# Patient Record
Sex: MALE | Race: WHITE | NOT HISPANIC OR LATINO | Employment: FULL TIME | URBAN - METROPOLITAN AREA
[De-identification: names, ages, dates, MRNs, and addresses within clinical notes are randomized per-mention and may not be internally consistent; named-entity substitution may affect disease eponyms.]

---

## 2018-05-09 ENCOUNTER — TRANSCRIBE ORDERS (OUTPATIENT)
Dept: ADMINISTRATIVE | Facility: HOSPITAL | Age: 53
End: 2018-05-09

## 2018-05-09 DIAGNOSIS — R06.02 SHORTNESS OF BREATH: Primary | ICD-10-CM

## 2018-05-14 ENCOUNTER — HOSPITAL ENCOUNTER (OUTPATIENT)
Dept: PULMONOLOGY | Facility: HOSPITAL | Age: 53
Discharge: HOME | End: 2018-05-14
Payer: COMMERCIAL

## 2018-05-14 ENCOUNTER — TRANSCRIBE ORDERS (OUTPATIENT)
Dept: PULMONOLOGY | Facility: HOSPITAL | Age: 53
End: 2018-05-14

## 2018-05-14 DIAGNOSIS — R06.00 DYSPNEA, UNSPECIFIED TYPE: Primary | ICD-10-CM

## 2018-05-14 DIAGNOSIS — R06.02 SHORTNESS OF BREATH: ICD-10-CM

## 2018-05-14 DIAGNOSIS — R06.00 DYSPNEA, UNSPECIFIED TYPE: ICD-10-CM

## 2018-05-14 PROCEDURE — 94727 GAS DIL/WSHOT DETER LNG VOL: CPT

## 2018-05-14 PROCEDURE — 94726 PLETHYSMOGRAPHY LUNG VOLUMES: CPT | Performed by: INTERNAL MEDICINE

## 2018-05-14 PROCEDURE — 94060 EVALUATION OF WHEEZING: CPT

## 2018-05-14 PROCEDURE — 94729 DIFFUSING CAPACITY: CPT | Performed by: INTERNAL MEDICINE

## 2018-05-14 PROCEDURE — 94060 EVALUATION OF WHEEZING: CPT | Performed by: INTERNAL MEDICINE

## 2018-05-14 PROCEDURE — 94760 N-INVAS EAR/PLS OXIMETRY 1: CPT

## 2018-05-14 PROCEDURE — 94729 DIFFUSING CAPACITY: CPT

## 2018-05-14 RX ORDER — ALBUTEROL SULFATE 2.5 MG/3ML
2.5 SOLUTION RESPIRATORY (INHALATION) ONCE AS NEEDED
Status: COMPLETED | OUTPATIENT
Start: 2018-05-14 | End: 2018-05-14

## 2018-05-14 RX ADMIN — ALBUTEROL SULFATE 2.5 MG: 2.5 SOLUTION RESPIRATORY (INHALATION) at 10:23

## 2018-07-17 ENCOUNTER — APPOINTMENT (OUTPATIENT)
Dept: RADIOLOGY | Facility: MEDICAL CENTER | Age: 53
End: 2018-07-17
Payer: COMMERCIAL

## 2018-07-17 ENCOUNTER — OFFICE VISIT (OUTPATIENT)
Dept: OBGYN CLINIC | Facility: CLINIC | Age: 53
End: 2018-07-17
Payer: COMMERCIAL

## 2018-07-17 DIAGNOSIS — M54.5 CHRONIC LOW BACK PAIN, UNSPECIFIED BACK PAIN LATERALITY, WITH SCIATICA PRESENCE UNSPECIFIED: ICD-10-CM

## 2018-07-17 DIAGNOSIS — G89.29 CHRONIC LOW BACK PAIN, UNSPECIFIED BACK PAIN LATERALITY, WITH SCIATICA PRESENCE UNSPECIFIED: ICD-10-CM

## 2018-07-17 DIAGNOSIS — G89.29 CHRONIC PAIN OF RIGHT KNEE: ICD-10-CM

## 2018-07-17 DIAGNOSIS — M17.11 PRIMARY OSTEOARTHRITIS OF RIGHT KNEE: ICD-10-CM

## 2018-07-17 DIAGNOSIS — M25.561 CHRONIC PAIN OF RIGHT KNEE: ICD-10-CM

## 2018-07-17 DIAGNOSIS — M17.12 PRIMARY OSTEOARTHRITIS OF LEFT KNEE: Primary | ICD-10-CM

## 2018-07-17 DIAGNOSIS — G89.29 CHRONIC PAIN OF LEFT KNEE: ICD-10-CM

## 2018-07-17 DIAGNOSIS — M25.562 CHRONIC PAIN OF LEFT KNEE: ICD-10-CM

## 2018-07-17 PROCEDURE — 73562 X-RAY EXAM OF KNEE 3: CPT

## 2018-07-17 PROCEDURE — 20610 DRAIN/INJ JOINT/BURSA W/O US: CPT | Performed by: ORTHOPAEDIC SURGERY

## 2018-07-17 PROCEDURE — 72110 X-RAY EXAM L-2 SPINE 4/>VWS: CPT

## 2018-07-17 PROCEDURE — 99203 OFFICE O/P NEW LOW 30 MIN: CPT | Performed by: ORTHOPAEDIC SURGERY

## 2018-07-17 RX ORDER — IBUPROFEN 200 MG
200 TABLET ORAL
COMMUNITY
End: 2019-02-13 | Stop reason: HOSPADM

## 2018-07-17 RX ORDER — VALSARTAN 160 MG/1
TABLET ORAL
Refills: 0 | COMMUNITY
Start: 2018-07-15 | End: 2019-03-22 | Stop reason: ALTCHOICE

## 2018-07-17 RX ORDER — BETAMETHASONE SODIUM PHOSPHATE AND BETAMETHASONE ACETATE 3; 3 MG/ML; MG/ML
12 INJECTION, SUSPENSION INTRA-ARTICULAR; INTRALESIONAL; INTRAMUSCULAR; SOFT TISSUE
Status: COMPLETED | OUTPATIENT
Start: 2018-07-17 | End: 2018-07-17

## 2018-07-17 RX ORDER — CETIRIZINE HYDROCHLORIDE 10 MG/1
10 TABLET ORAL
COMMUNITY

## 2018-07-17 RX ORDER — ACETAMINOPHEN 325 MG/1
650 TABLET ORAL EVERY 6 HOURS
COMMUNITY
End: 2019-02-13 | Stop reason: HOSPADM

## 2018-07-17 RX ORDER — BUPIVACAINE HYDROCHLORIDE 2.5 MG/ML
4 INJECTION, SOLUTION INFILTRATION; PERINEURAL
Status: COMPLETED | OUTPATIENT
Start: 2018-07-17 | End: 2018-07-17

## 2018-07-17 RX ADMIN — BUPIVACAINE HYDROCHLORIDE 4 ML: 2.5 INJECTION, SOLUTION INFILTRATION; PERINEURAL at 08:53

## 2018-07-17 RX ADMIN — BETAMETHASONE SODIUM PHOSPHATE AND BETAMETHASONE ACETATE 12 MG: 3; 3 INJECTION, SUSPENSION INTRA-ARTICULAR; INTRALESIONAL; INTRAMUSCULAR; SOFT TISSUE at 08:53

## 2018-07-17 NOTE — PROGRESS NOTES
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  His main complaint is pain in the left knee  Patient also complains of swelling in the back of the left knee  Patient presents for evaluation with radiographs  Patient is a recovering addict  He stopped using stuff 3 years ago  Patient is now and alcohol an addiction counselor  Patient used to work as a  in the past      Current Medications  Current Outpatient Prescriptions   Medication Sig Dispense Refill    acetaminophen (TYLENOL) 325 mg tablet Take 650 mg by mouth every 6 (six) hours      cetirizine (ZyrTEC) 10 mg tablet Take 10 mg by mouth      diphenhydrAMINE-APAP  MG TABS Take 1 tablet by mouth      ibuprofen (MOTRIN) 200 mg tablet Take 200 mg by mouth      valsartan (DIOVAN) 160 mg tablet   0    VENTOLIN  (90 Base) MCG/ACT inhaler Inhale 2 puffs every 4 (four) hours as needed  0     No current facility-administered medications for this visit          Current Problems    Active Problems:   Patient Active Problem List    Diagnosis Date Noted    Shortness of breath          Review of Systems:    General: negative for - chills, fatigue, fever,  weight gain or weight loss  Psychological: negative for - anxiety, behavioral disorder, concentration difficulties  Ophthalmic: negative for - blurry vision, decreased vision, double vision,  ENT: negative for - hearing difficulties   Gastrointestinal: negative for - abdominal pain,  blood in stools, change in bowel habits,  nausea/vomiting  Genito-Urinary: negative for - dysuria, incontinence, irregular/heavy menses or urinary frequency/urgency  Musculoskeletal: see HPI  Positive for joint swelling muscle pain weakness and leg swelling    Past Medical History:   Past Medical History:   Diagnosis Date    Fractures     Hypertension        Past Surgical History:   Past Surgical History:   Procedure Laterality Date    BACK SURGERY      HERNIA REPAIR      KNEE SURGERY         Family History:  Family history reviewed and non-contributory  Family History   Problem Relation Age of Onset    No Known Problems Mother     Hypertension Father        Social History:  Social History     Social History    Marital status:      Spouse name: N/A    Number of children: N/A    Years of education: N/A     Social History Main Topics    Smoking status: None    Smokeless tobacco: None    Alcohol use None    Drug use: Unknown    Sexual activity: Not Asked     Other Topics Concern    None     Social History Narrative    None       Allergies:   No Known Allergies        Physical ExaminationThere were no vitals taken for this visit    Gen: Alert and oriented to person, place, time  HEENT: EOMI, eyes clear, moist mucus membranes, hearing intact    Orthopedic Exam  Spine examination healed scar in the thoracic region  Mild discomfort in the lumbar region paraspinal muscles  No neurological deficits in both lower extremity except altered sensation due to neuropathy status post surgery    Left knee mild effusion present  Flexion 0-120 degrees with joint line tenderness and crepitus  Radiographs show tricompartmental osteoarthritis worse in medial compartment    Right knee flexion 0 to 120° with medial joint line tenderness and crepitus  Radiographs show tricompartmental osteoarthritis    Radiographs of lumbar spine show multilevel degeneration          Impression  Bilateral knee osteoarthritis with Baker's cyst and left knee posteriorly  Chronic back pain due to 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 spine with previous fusion thoracic spine        Plan    Discussed treatment with the patient  Patient aware of the importance of weight loss regular exercise and physical therapy  Left knee aspirated and injected with steroid  Will request hyaluronic acid injections for both knees  Follow-up in 3 months  Patient is aware that he will need to see pain and spine management for his back problems but does not want this done as yet  Large joint arthrocentesis  Date/Time: 7/17/2018 8:53 AM  Consent given by: patient  Site marked: site marked  Timeout: Immediately prior to procedure a time out was called to verify the correct patient, procedure, equipment, support staff and site/side marked as required   Supporting Documentation  Indications: pain and joint swelling   Procedure Details  Location: knee - L knee  Preparation: Patient was prepped and draped in the usual sterile fashion  Needle size: 22 G  Ultrasound guidance: no  Approach: anterolateral  Medications administered: 4 mL bupivacaine 0 25 %; 12 mg betamethasone acetate-betamethasone sodium phosphate 6 (3-3) mg/mL    Aspirate amount: 10 mL  Aspirate: clear and yellow  Patient tolerance: patient tolerated the procedure well with no immediate complications  Dressing:  Sterile dressing applied        Kelechi Wall MD        Portions of the record may have been created with voice recognition software   Occasional wrong word or "sound a like" substitutions may have occurred due to the inherent limitations of voice recognition software   Read the chart carefully and recognize, using context, where substitutions have occurred

## 2018-07-30 ENCOUNTER — TELEPHONE (OUTPATIENT)
Dept: OBGYN CLINIC | Facility: HOSPITAL | Age: 53
End: 2018-07-30

## 2018-08-21 ENCOUNTER — OFFICE VISIT (OUTPATIENT)
Dept: OBGYN CLINIC | Facility: CLINIC | Age: 53
End: 2018-08-21
Payer: COMMERCIAL

## 2018-08-21 VITALS
DIASTOLIC BLOOD PRESSURE: 78 MMHG | HEIGHT: 75 IN | BODY MASS INDEX: 30.54 KG/M2 | RESPIRATION RATE: 12 BRPM | WEIGHT: 245.6 LBS | HEART RATE: 87 BPM | SYSTOLIC BLOOD PRESSURE: 123 MMHG

## 2018-08-21 DIAGNOSIS — M17.12 PRIMARY OSTEOARTHRITIS OF LEFT KNEE: Primary | ICD-10-CM

## 2018-08-21 DIAGNOSIS — M17.11 PRIMARY OSTEOARTHRITIS OF RIGHT KNEE: ICD-10-CM

## 2018-08-21 PROCEDURE — 99212 OFFICE O/P EST SF 10 MIN: CPT | Performed by: ORTHOPAEDIC SURGERY

## 2018-08-21 NOTE — PROGRESS NOTES
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  Reports he is negative for hepatitis and HIV as he was tested for this  He states his left knee does give out at times  He worked previously as a  and was hard on his knees  He is considering joint replacement  Review of Systems  Review of systems negative unless otherwise specified in HPI    Past Medical History  Past Medical History:   Diagnosis Date    Fractures     Hypertension      Past Surgical History  Past Surgical History:   Procedure Laterality Date    BACK SURGERY      HERNIA REPAIR      KNEE SURGERY       Current Medications  Current Outpatient Prescriptions on File Prior to Visit   Medication Sig Dispense Refill    acetaminophen (TYLENOL) 325 mg tablet Take 650 mg by mouth every 6 (six) hours      cetirizine (ZyrTEC) 10 mg tablet Take 10 mg by mouth      diphenhydrAMINE-APAP  MG TABS Take 1 tablet by mouth      ibuprofen (MOTRIN) 200 mg tablet Take 200 mg by mouth      valsartan (DIOVAN) 160 mg tablet   0    VENTOLIN  (90 Base) MCG/ACT inhaler Inhale 2 puffs every 4 (four) hours as needed  0     No current facility-administered medications on file prior to visit  Recent Labs (HCT,HGB,PT,INR,ESR,CRP,GLU,HgA1C)  No results found for: HCT, HGB, WBC, PT, INR, ESR, CRP, GLUCOSE, HGBA1C    Physical exam  · General: Awake, Alert, Oriented  · Eyes: Pupils equal, round and reactive to light  · Heart: regular rate and rhythm  · Lungs: No audible wheezing  · Abdomen: soft , thin  bilateral Knee exam  ·  bilateral knees with out any current cutaneous lesions  There is well-healed arthroscopy scars    Right knee with full extension flexion 120  Left knee 3-120 degrees  Positive Baker cyst left knee and trace effusion  Medial joint line tenderness both knees  Quad and hamstring strength right knee 5/5 left knee 4+/5  Dorsalis pedis posterior tibial pulses intact 4/4  Procedure   none    Imaging   none  1  Primary osteoarthritis of left knee    2  Primary osteoarthritis of right knee      Assessment:  bilateral  Knee osteoarthritis left more symptomatic than right with Baker cyst as well  Plan:    As the patient only got minimally with cortisone injection in due to his young age we will attempt to get viscosupplementation approved as long as is not cost prohibitive for him in his insurance  Would and inject both knees with disc and see how he does  Ultimately the patient will require total knee arthroplasties at sometime but again due to his young age would like to hold off as long as possible  Patient does not want to do physical therapy as he has done his numerous times in the past knee does work out at [employer] and use a bicycle as well  Continue ice and elevation as needed  Anti-inflammatories as needed  He was given a pamphlet on Synvisc-One in the office today

## 2018-08-21 NOTE — PATIENT INSTRUCTIONS
As the patient only got minimally with cortisone injection in due to his young age we will attempt to get viscosupplementation approved as long as is not cost prohibitive for him in his insurance  Would and inject both knees with disc and see how he does  Ultimately the patient will require total knee arthroplasties at sometime but again due to his young age would like to hold off as long as possible  Patient does not want to do physical therapy as he has done his numerous times in the past knee does work out at [employer] and use a bicycle as well  Continue ice and elevation as needed  Anti-inflammatories as needed

## 2018-08-27 ENCOUNTER — TELEPHONE (OUTPATIENT)
Dept: OBGYN CLINIC | Facility: HOSPITAL | Age: 53
End: 2018-08-27

## 2018-08-27 NOTE — TELEPHONE ENCOUNTER
Spoke with Azucena Aden at [facility] and they informed me that they had tried calling the patient to schedule delivery and they have had no contact with them  I resent the script in again to restart the process

## 2018-09-17 ENCOUNTER — TELEPHONE (OUTPATIENT)
Dept: OBGYN CLINIC | Facility: HOSPITAL | Age: 53
End: 2018-09-17

## 2018-09-17 NOTE — TELEPHONE ENCOUNTER
Left message for patient informing him that we received his bilateral Synvisc-One injections in the office  Please schedule the patients appointment accordingly        BILATERAL SYNVISC-ONE INJECTION([facility]///DO NOT BILL)

## 2018-09-21 ENCOUNTER — OFFICE VISIT (OUTPATIENT)
Dept: OBGYN CLINIC | Facility: CLINIC | Age: 53
End: 2018-09-21
Payer: COMMERCIAL

## 2018-09-21 VITALS
DIASTOLIC BLOOD PRESSURE: 84 MMHG | SYSTOLIC BLOOD PRESSURE: 119 MMHG | BODY MASS INDEX: 30.21 KG/M2 | HEART RATE: 71 BPM | HEIGHT: 75 IN | WEIGHT: 243 LBS

## 2018-09-21 DIAGNOSIS — M17.11 PRIMARY OSTEOARTHRITIS OF RIGHT KNEE: ICD-10-CM

## 2018-09-21 DIAGNOSIS — M17.12 PRIMARY OSTEOARTHRITIS OF LEFT KNEE: Primary | ICD-10-CM

## 2018-09-21 PROCEDURE — 20610 DRAIN/INJ JOINT/BURSA W/O US: CPT | Performed by: ORTHOPAEDIC SURGERY

## 2018-09-21 NOTE — PATIENT INSTRUCTIONS
Knee Exercises   WHAT YOU NEED TO KNOW:   What do I need to know about knee exercises? Knee exercises help strengthen the muscles around your knee  Strong muscles can help reduce pain and decrease your risk of future injury  Knee exercises also help you heal after an injury or surgery  · Start slow  These are beginning exercises  Ask your healthcare provider if you need to see a physical therapist for more advanced exercises  As you get stronger, you may be able to do more sets of each exercise or add weights  · Stop if you feel pain  It is normal to feel some discomfort at first  Regular exercise will help decrease your discomfort over time  · Do the exercises on both legs  Do this so both knees remain strong  · Warm up before you do knee exercises  Walk or ride a stationary bike for 5 or 10 minutes to warm your muscles  How do I perform knee stretches safely? Always stretch before you do strengthening exercises  Do these stretching exercises again after you do the strengthening exercises  Do these stretches 4 or 5 days a week, or as directed  · Standing calf stretch: Face a wall and place both palms flat on the wall, or hold the back of a chair for balance  Keep a slight bend in your knees  Take a big step backward with one leg  Keep your other leg directly under you  Keep both heels flat and press your hips forward  Hold the stretch for 30 seconds, and then relax for 30 seconds  Switch legs  Repeat 2 or 3 times on each leg  · Standing quadriceps stretch:  Stand and place one hand against a wall or hold the back of a chair for balance  With your weight on one leg, bend your other leg and grab your ankle  Bring your heel toward your buttocks  Hold the stretch for 30 to 60 seconds  Switch legs  Repeat 2 or 3 times on each leg  · Sitting hamstring stretch:  Sit with both legs straight in front of you  Do not point or flex your toes   Place your palms on the floor and slide your hands forward until you feel the stretch  Do not round your back  Hold the stretch for 30 seconds  Repeat 2 or 3 times  How do I perform knee strengthening exercises safely? Do these exercises 4 or 5 days a week, or as directed  · Standing half squats:  Stand with your feet shoulder-width apart  Lean your back against a wall or hold the back of a chair for balance, if needed  Slowly sit down about 10 inches, as if you are going to sit in a chair  Your body weight should be mostly over your heels  Hold the squat for 5 seconds, then rise to a standing position  Do 3 sets of 10 squats to strengthen your buttocks and thighs  · Standing hamstring curls: Face a wall and place both palms flat on the wall, or hold the back of a chair for balance  With your weight on one leg, lift your other foot as close to your buttocks as you can  Hold for 5 seconds and then lower your leg  Do 2 sets of 10 curls on each leg  This exercise strengthens the muscles in the back of your thigh  · Standing calf raises:  Face a wall and place both palms flat on the wall, or hold the back of a chair for balance  Stand up straight, and do not lean  Place all your weight on one leg by lifting the other foot off the floor  Raise the heel of the foot that is on the floor as high as you can and then lower it  Do 2 sets of 10 calf raises on each leg to strengthen your calf muscles  · Straight leg lifts:  Lie on your stomach with straight legs  Fold your arms in front of you and rest your head in your arms  Tighten your leg muscles and raise one leg as high as you can  Hold for 5 seconds, then lower your leg  Do 2 sets of 10 lifts on each leg to strengthen your buttocks  · Sitting leg lifts:  Sit in a chair  Slowly straighten and raise one leg  Squeeze your thigh muscles and hold for 5 seconds  Relax and return your foot to the floor  Do 2 sets of 10 lifts on each leg   This helps strengthen the muscles in the front of your thigh  When should I contact my healthcare provider? · You have new pain or your pain becomes worse  · You have questions or concerns about your condition or care  CARE AGREEMENT:   You have the right to help plan your care  Learn about your health condition and how it may be treated  Discuss treatment options with your caregivers to decide what care you want to receive  You always have the right to refuse treatment  The above information is an  only  It is not intended as medical advice for individual conditions or treatments  Talk to your doctor, nurse or pharmacist before following any medical regimen to see if it is safe and effective for you  © 2017 [facility] Information is for End User's use only and may not be sold, redistributed or otherwise used for commercial purposes  All illustrations and images included in CareNotes® are the copyrighted property of A D A M [employer] Inc  or [employer]

## 2018-09-21 NOTE — PROGRESS NOTES
Chief Complaint  Bilateral knee pain due to osteoarthritis    History Of Presenting Illness  Inocencio Mahmood 1965 presents with bilateral knee pain due osteoarthritis  Patient presents for bilateral Synvisc-One injection      Current Medications  Current Outpatient Prescriptions   Medication Sig Dispense Refill    acetaminophen (TYLENOL) 325 mg tablet Take 650 mg by mouth every 6 (six) hours      cetirizine (ZyrTEC) 10 mg tablet Take 10 mg by mouth      diphenhydrAMINE-APAP  MG TABS Take 1 tablet by mouth      ibuprofen (MOTRIN) 200 mg tablet Take 200 mg by mouth      valsartan (DIOVAN) 160 mg tablet   0    VENTOLIN  (90 Base) MCG/ACT inhaler Inhale 2 puffs every 4 (four) hours as needed  0     No current facility-administered medications for this visit          Current Problems    Active Problems:   Patient Active Problem List    Diagnosis Date Noted    Shortness of breath          Review of Systems:    General: negative for - chills, fatigue, fever,  weight gain or weight loss  Psychological: negative for - anxiety, behavioral disorder, concentration difficulties      Past Medical History:   Past Medical History:   Diagnosis Date    Fractures     Hypertension        Past Surgical History:   Past Surgical History:   Procedure Laterality Date    BACK SURGERY      HERNIA REPAIR      KNEE SURGERY         Family History:  Family history reviewed and non-contributory  Family History   Problem Relation Age of Onset    No Known Problems Mother     Hypertension Father        Social History:  Social History     Social History    Marital status:      Spouse name: N/A    Number of children: N/A    Years of education: N/A     Social History Main Topics    Smoking status: Never Smoker    Smokeless tobacco: Never Used    Alcohol use Yes      Comment: In Recovery    Drug use: Yes      Comment: In Recovery    Sexual activity: Yes     Other Topics Concern    None     Social History Narrative    None       Allergies:   No Known Allergies        Physical ExaminationBP 119/84   Pulse 71   Ht 6' 3" (1 905 m)   Wt 110 kg (243 lb)   BMI 30 37 kg/m²   Gen: Alert and oriented to person, place, time  HEENT: EOMI, eyes clear, moist mucus membranes, hearing intact      Orthopedic Exam  Both knees no effusion  Range of motion 0-120 degrees joint line tenderness          Impression  Bilateral knee osteoarthritis            Plan    Both knees injected with Synvisc-One  Follow-up 5 months x-ray on arrival both knees  Large joint arthrocentesis  Date/Time: 9/21/2018 10:42 AM  Consent given by: patient  Site marked: site marked  Timeout: Immediately prior to procedure a time out was called to verify the correct patient, procedure, equipment, support staff and site/side marked as required   Supporting Documentation  Indications: pain and joint swelling   Procedure Details  Location: knee - R knee  Preparation: Patient was prepped and draped in the usual sterile fashion  Needle size: 22 G  Ultrasound guidance: no  Approach: anterolateral  Medications administered: 48 mg hylan 48 MG/6ML    Patient tolerance: patient tolerated the procedure well with no immediate complications  Dressing:  Sterile dressing applied  Large joint arthrocentesis  Date/Time: 9/21/2018 10:43 AM  Consent given by: patient  Site marked: site marked  Timeout: Immediately prior to procedure a time out was called to verify the correct patient, procedure, equipment, support staff and site/side marked as required   Supporting Documentation  Indications: pain and joint swelling   Procedure Details  Location: knee - L knee  Preparation: Patient was prepped and draped in the usual sterile fashion  Needle size: 22 G  Ultrasound guidance: no  Approach: anterolateral  Medications administered: 48 mg hylan 48 MG/6ML    Patient tolerance: patient tolerated the procedure well with no immediate complications  Dressing:  Sterile dressing applied        Shelley Ocampo MD        Portions of the record may have been created with voice recognition software   Occasional wrong word or "sound a like" substitutions may have occurred due to the inherent limitations of voice recognition software   Read the chart carefully and recognize, using context, where substitutions have occurred

## 2019-01-11 ENCOUNTER — OFFICE VISIT (OUTPATIENT)
Dept: OBGYN CLINIC | Facility: CLINIC | Age: 54
End: 2019-01-11
Payer: COMMERCIAL

## 2019-01-11 VITALS
HEIGHT: 75 IN | BODY MASS INDEX: 30.46 KG/M2 | WEIGHT: 245 LBS | HEART RATE: 85 BPM | DIASTOLIC BLOOD PRESSURE: 89 MMHG | SYSTOLIC BLOOD PRESSURE: 136 MMHG

## 2019-01-11 DIAGNOSIS — M17.12 PRIMARY OSTEOARTHRITIS OF LEFT KNEE: Primary | ICD-10-CM

## 2019-01-11 DIAGNOSIS — M17.11 PRIMARY OSTEOARTHRITIS OF RIGHT KNEE: ICD-10-CM

## 2019-01-11 PROCEDURE — 99213 OFFICE O/P EST LOW 20 MIN: CPT | Performed by: ORTHOPAEDIC SURGERY

## 2019-01-11 PROCEDURE — 20610 DRAIN/INJ JOINT/BURSA W/O US: CPT | Performed by: ORTHOPAEDIC SURGERY

## 2019-01-11 RX ORDER — BUPIVACAINE HYDROCHLORIDE 2.5 MG/ML
4 INJECTION, SOLUTION INFILTRATION; PERINEURAL
Status: COMPLETED | OUTPATIENT
Start: 2019-01-11 | End: 2019-01-11

## 2019-01-11 RX ORDER — BETAMETHASONE SODIUM PHOSPHATE AND BETAMETHASONE ACETATE 3; 3 MG/ML; MG/ML
12 INJECTION, SUSPENSION INTRA-ARTICULAR; INTRALESIONAL; INTRAMUSCULAR; SOFT TISSUE
Status: COMPLETED | OUTPATIENT
Start: 2019-01-11 | End: 2019-01-11

## 2019-01-11 RX ADMIN — BETAMETHASONE SODIUM PHOSPHATE AND BETAMETHASONE ACETATE 12 MG: 3; 3 INJECTION, SUSPENSION INTRA-ARTICULAR; INTRALESIONAL; INTRAMUSCULAR; SOFT TISSUE at 15:55

## 2019-01-11 RX ADMIN — BUPIVACAINE HYDROCHLORIDE 4 ML: 2.5 INJECTION, SOLUTION INFILTRATION; PERINEURAL at 16:00

## 2019-01-11 RX ADMIN — BETAMETHASONE SODIUM PHOSPHATE AND BETAMETHASONE ACETATE 12 MG: 3; 3 INJECTION, SUSPENSION INTRA-ARTICULAR; INTRALESIONAL; INTRAMUSCULAR; SOFT TISSUE at 16:00

## 2019-01-11 RX ADMIN — BUPIVACAINE HYDROCHLORIDE 4 ML: 2.5 INJECTION, SOLUTION INFILTRATION; PERINEURAL at 15:55

## 2019-01-11 NOTE — PROGRESS NOTES
Chief Complaint  Bilateral knee pain    History Of Presenting Illness  Connor Art 1965 presents with bilateral knee pain due to osteoarthritis  Patient had Synvisc-One injection both knees September 21st, 2018  Excellent relief of symptoms till now  Patient is getting an aching discomfort in both knees  Patient would like repeat steroid injections      Current Medications  Current Outpatient Prescriptions   Medication Sig Dispense Refill    acetaminophen (TYLENOL) 325 mg tablet Take 650 mg by mouth every 6 (six) hours      cetirizine (ZyrTEC) 10 mg tablet Take 10 mg by mouth      diphenhydrAMINE-APAP  MG TABS Take 1 tablet by mouth      ibuprofen (MOTRIN) 200 mg tablet Take 200 mg by mouth      VENTOLIN  (90 Base) MCG/ACT inhaler Inhale 2 puffs every 4 (four) hours as needed  0    valsartan (DIOVAN) 160 mg tablet   0     No current facility-administered medications for this visit          Current Problems    Active Problems:   Patient Active Problem List    Diagnosis Date Noted    Shortness of breath          Review of Systems:    General: negative for - chills, fatigue, fever,  weight gain or weight loss      Past Medical History:   Past Medical History:   Diagnosis Date    Fractures     Hypertension        Past Surgical History:   Past Surgical History:   Procedure Laterality Date    BACK SURGERY      HERNIA REPAIR      KNEE SURGERY         Family History:  Family history reviewed and non-contributory  Family History   Problem Relation Age of Onset    No Known Problems Mother     Hypertension Father        Social History:  Social History     Social History    Marital status:      Spouse name: N/A    Number of children: N/A    Years of education: N/A     Social History Main Topics    Smoking status: Never Smoker    Smokeless tobacco: Never Used    Alcohol use Yes      Comment: In Recovery    Drug use: Yes      Comment: In Recovery    Sexual activity: Yes Other Topics Concern    None     Social History Narrative    None       Allergies:   No Known Allergies        Physical ExaminationBP 136/89   Pulse 85   Ht 6' 3" (1 905 m)   Wt 111 kg (245 lb)   BMI 30 62 kg/m²   Gen: Alert and oriented to person, place, time      Orthopedic Exam  Both knees no effusion  0-130 flexion with joint line tenderness mild crepitus  Radiographs confirm medial compartment osteoarthritis          Impression  Bilateral knee osteoarthritis            Plan    Discussed treatment with the patient  Patient aware of the importance of weight loss regular exercise over-the-counter pain medication icing  Both knees injected with steroid and local anesthetic  Follow-up in April 2019 for repeat Synvisc-One injection  Large joint arthrocentesis  Date/Time: 1/11/2019 3:55 PM  Consent given by: patient  Site marked: site marked  Timeout: Immediately prior to procedure a time out was called to verify the correct patient, procedure, equipment, support staff and site/side marked as required   Supporting Documentation  Indications: pain and joint swelling   Procedure Details  Location: knee - L knee  Preparation: Patient was prepped and draped in the usual sterile fashion  Needle size: 22 G  Ultrasound guidance: no  Approach: anterolateral  Medications administered: 4 mL bupivacaine 0 25 %; 12 mg betamethasone acetate-betamethasone sodium phosphate 6 (3-3) mg/mL    Patient tolerance: patient tolerated the procedure well with no immediate complications  Dressing:  Sterile dressing applied  Large joint arthrocentesis  Date/Time: 1/11/2019 4:00 PM  Consent given by: patient  Site marked: site marked  Timeout: Immediately prior to procedure a time out was called to verify the correct patient, procedure, equipment, support staff and site/side marked as required   Supporting Documentation  Indications: pain and joint swelling   Procedure Details  Location: knee - R knee  Preparation: Patient was prepped and draped in the usual sterile fashion  Needle size: 22 G  Ultrasound guidance: no  Approach: anterolateral  Medications administered: 4 mL bupivacaine 0 25 %; 12 mg betamethasone acetate-betamethasone sodium phosphate 6 (3-3) mg/mL    Patient tolerance: patient tolerated the procedure well with no immediate complications  Dressing:  Sterile dressing applied      Joanne Moore MD        Portions of the record may have been created with voice recognition software   Occasional wrong word or "sound a like" substitutions may have occurred due to the inherent limitations of voice recognition software   Read the chart carefully and recognize, using context, where substitutions have occurred

## 2019-01-11 NOTE — PATIENT INSTRUCTIONS
Decision Aid for Knee Osteoarthritis   AMBULATORY CARE:   What you need to know about decisions for knee osteoarthritis:  You can help make decisions about being screened for osteoarthritis  You can also help plan treatment if osteoarthritis is found with screening, or you develop it  Osteoarthritis screening is a test done to find osteoarthritis early  Screening is different from diagnosis because screening is used when you first start to have signs or symptoms  This means management or treatment can start early to help prevent joint damage  Treatments include medicine and surgery, but you can also manage osteoarthritis with lifestyle changes  What you need to know about knee osteoarthritis:   · Osteoarthritis is a condition that causes tissue between bones to wear away  This can happen slowly over time or quickly because of an injury  The bones rub together when you move  This causes pain and can limit mobility  · Osteoarthritis is the most common form of arthritis  About 30 million people in the [place] have osteoarthritis  · Osteoarthritis is more common in women than in men  Other risk factors include being overweight or having a family history of osteoarthritis  · Talk to your healthcare provider if you think you have signs or symptoms of osteoarthritis  Examples include joint pain with movement, tender or stiff joint, or a grating feeling with movement  You may also feel hard lumps around the joint, called bone spurs  How to know if you are a good candidate for osteoarthritis screening:  Screening may be helpful for you if any of the following is true:  · You are 50 years or older  · You are overweight or obese  · You do work or play a sport that puts repeated stress on your knee joint  · You have a family history of osteoarthritis  · You had a knee joint injury, even if it happened many years ago  · You were born with a joint or cartilage problem      · You have mild signs or symptoms of osteoarthritis  · Your symptoms are starting to affect your ability to do your daily activities  How osteoarthritis screening is done:  No test is used to diagnose osteoarthritis  Your healthcare provider will check the following:  · How much pain, tenderness, or inflammation you have in the joint    · Range of motion in the joint (how far it can move in every direction)    · Your ability to walk without pain, or how your symptoms changed the way you walk    · The strength of muscles around the joint  Benefits and risks of screening:  Talk with your healthcare provider about the risks and benefits of screening:  · Benefits  include finding osteoarthritis early, when you can manage the condition  You may be able to slow the progress of joint damage by losing weight or exercising more  You can also help make a treatment plan that you can change over time as needed  · Risks  include a false belief that you will not develop osteoarthritis  Your screening test may find that you do not have osteoarthritis  This can be because signs and symptoms are mild  You can still develop more severe signs and symptoms over time  It is important to talk with your healthcare provider about how often to have screening  Questions to ask your healthcare provider to help you make decisions about screening:   · How high is my risk for osteoarthritis? · How often do I need to have screening? · Where is the screening done? · Do I need to do anything to get ready to have screening? What happens after osteoarthritis screening: You will meet with your healthcare provider to go over the results of your screening  You, your family or caregiver, and your healthcare provider can talk about your treatment options  Together you can decide which treatment is right for you   You may need more tests to diagnose anything that showed up on the screening test  Common tests include an x-ray or CT scan to check the amount of tissue between bones or to find bone spurs  How osteoarthritis is treated, and the benefits of treatment:   · Lifestyle changes  include weight loss and exercise  Weight loss can help take pressure off the joint  Exercise can help build muscles around the joint  This helps build stability and strength  Your healthcare provider, a dietitian, or a physical therapist can help you make nutrition and exercise plans  · Devices  can help you stay active and relieve your symptoms  Shoe inserts support your joints and take pressure off the joints  This can help reduce pain that happens when you stand or walk  A knee brace can add stability to your knee when you walk  A cane can help take weight off your knee as you walk  · Medicines  include acetaminophen, NSAIDs, and certain antidepressants  NSAIDs, such as ibuprofen, and acetaminophen can be taken as a pill or rubbed into your skin in a cream  Acetaminophen and NSAIDs help relieve pain  NSAIDs also help reduce inflammation  Both medicines are available without a prescription  Do not take these medicines without talking to your healthcare provider first  Stronger forms of these medicines are available with a prescription  Antidepressants are only available with a prescription  · Therapy  includes physical and occupational therapy  A physical therapist can help you strengthen muscles around the joint and increase your range of motion  An occupational therapist can help you find easier ways to do your daily activities  For example, you may be shown how to climb stairs without putting stress on your knee  · Steroid medicine  may be injected into the knee area if your symptoms become worse  This can reduce inflammation and relieve pain  · Surgery  may be done if other treatments do not work and your symptoms become severe  Debridement is surgery to clean pieces of bone and cartilage out of the joint  This is done if you have knee buckling or locking   Osteotomy is surgery to move bones into a different position so they do not rub against other bones  Surgery called arthroplasty is used to remove the damaged joint and replace it with an artificial joint  Surgery can relieve pain by removing the problem that is causing your pain  Risks of osteoarthritis treatment:   · Lifestyle changes  will not reverse the damage to the tissue between your joints  It may prevent symptoms from getting worse  It may also be difficult to exercise if you have severe knee pain  · Medicines  must be taken in limited doses  Acetaminophen can cause liver damage, and NSAIDs can cause kidney damage  The pain may come back before you can take another dose  Antidepressants can cause certain side effects  · Steroid injections  are usually limited to about 4 each year  This is because the medicine can cause joint damage over time  Steroids can also increase your risk for infections, cause changes in your mood, and increase blood sugar levels  · Surgery  increases your risk for an infection or blood clot  Nerves, blood vessels, or tissues around the knee can be damaged during an osteotomy  An artificial joint may wear out over time  It may also become loose  It will need to be replaced if it wears out or comes loose  You will need to do knee exercises to prevent scar tissue from building up in your knee  This can be painful  Questions to ask your healthcare provider to help you make decisions about treatment:   · How will I know if signs and symptoms are becoming severe enough to need treatment? · How long will it take for each treatment option to help my knee pain get better? · Which pain medicines will work best for me? · Am I a good candidate for steroid injections? · Am I a good candidate for knee replacement surgery? · How long is recovery from knee replacement surgery? · Will I need to have more surgery over time?     · How often will I need to do knee exercises to prevent scar tissue from building up? © 2017 [facility] Information is for End User's use only and may not be sold, redistributed or otherwise used for commercial purposes  All illustrations and images included in CareNotes® are the copyrighted property of A D A M [employer] Inc  or Azar Carty  The above information is an  only  It is not intended as medical advice for individual conditions or treatments  Talk to your doctor, nurse or pharmacist before following any medical regimen to see if it is safe and effective for you

## 2019-01-24 ENCOUNTER — OFFICE VISIT (OUTPATIENT)
Dept: SURGERY | Facility: HOSPITAL | Age: 54
End: 2019-01-24
Payer: COMMERCIAL

## 2019-01-24 VITALS
BODY MASS INDEX: 30.96 KG/M2 | SYSTOLIC BLOOD PRESSURE: 146 MMHG | TEMPERATURE: 98.7 F | DIASTOLIC BLOOD PRESSURE: 86 MMHG | HEIGHT: 75 IN | WEIGHT: 249 LBS | HEART RATE: 79 BPM

## 2019-01-24 DIAGNOSIS — D17.1 LIPOMA OF TORSO: ICD-10-CM

## 2019-01-24 DIAGNOSIS — K42.9 UMBILICAL HERNIA WITHOUT OBSTRUCTION AND WITHOUT GANGRENE: Primary | ICD-10-CM

## 2019-01-24 PROCEDURE — 99204 OFFICE O/P NEW MOD 45 MIN: CPT | Performed by: SURGERY

## 2019-01-24 RX ORDER — SODIUM CHLORIDE, SODIUM LACTATE, POTASSIUM CHLORIDE, CALCIUM CHLORIDE 600; 310; 30; 20 MG/100ML; MG/100ML; MG/100ML; MG/100ML
125 INJECTION, SOLUTION INTRAVENOUS CONTINUOUS
Status: CANCELLED | OUTPATIENT
Start: 2019-01-24

## 2019-01-24 RX ORDER — CEFAZOLIN SODIUM 2 G/50ML
2000 SOLUTION INTRAVENOUS ONCE
Status: CANCELLED | OUTPATIENT
Start: 2019-02-06 | End: 2019-01-24

## 2019-01-24 RX ORDER — CHLORHEXIDINE GLUCONATE 4 G/100ML
SOLUTION TOPICAL DAILY PRN
Status: CANCELLED | OUTPATIENT
Start: 2019-01-24

## 2019-01-24 NOTE — H&P
Assessment/Plan:    Umbilical hernia  Patient with history of previous laparoscopic surgery now with a bulge  likely a umbilical hernia containing fat  It is non reducible  Patient like this repaired  I will get an ultrasound for further evaluation and confirmation  In the interim patient will be scheduled for likely umbilical hernia repair in addition to removal of the abdominal lipomas  The procedure self including also she had risk and benefits were discussed the patient great length including bleeding, infection, recurrence, injury to surrounding structures  Patient verbalized understands risks is willing proceed, consent was signed  He will undergo an EKG prior to surgery    Lipoma of torso  Patient with multiple lipomas of the torso  He would like to have these removed  Think this is reasonable  Will schedule him for excision of multiple lipomas of the abdominal wall  The procedure self including all associated risks and benefits were discussed at great length  This included bleeding, infection, recurrence  The patient verbalized understands risks is willing to proceed, consent was signed  He will undergo an EKG prior surgery       Diagnoses and all orders for this visit:    Umbilical hernia without obstruction and without gangrene  -     US abdominal wall; Future    Lipoma of torso          Subjective:      Patient ID: Apryl Dockery is a 52 y o  male  61-year-old gentle with a history of hypertension, laparoscopic inguinal hernia pair, presents for evaluation potential lipomas and also a bulge around the umbilicus  Patient states he has multiple lipomas on along his abdominal wall which cause him significant discomfort  Addition he has a bulge just above the umbilicus which she has was instructed is lipoma or this was a hernia  Patient denies any overlying skin changes of either the lipoma is or the skin overlying the hernia  Denies any nausea vomiting  No fevers or chills    No chest pain shortness of breath  No changes in bowel bladder habits  The following portions of the patient's history were reviewed and updated as appropriate:   He  has a past medical history of Fractures and Hypertension  He   Patient Active Problem List    Diagnosis Date Noted    Lipoma of torso 79/17/8626    Umbilical hernia 58/26/8152    Shortness of breath      He  has a past surgical history that includes Knee surgery; Hernia repair; and Back surgery  His family history includes Hypertension in his father; No Known Problems in his mother  He  reports that he has never smoked  He has never used smokeless tobacco  He reports that he drinks alcohol  He reports that he uses drugs  Current Outpatient Prescriptions   Medication Sig Dispense Refill    acetaminophen (TYLENOL) 325 mg tablet Take 650 mg by mouth every 6 (six) hours      cetirizine (ZyrTEC) 10 mg tablet Take 10 mg by mouth      diphenhydrAMINE-APAP  MG TABS Take 1 tablet by mouth      ibuprofen (MOTRIN) 200 mg tablet Take 200 mg by mouth      valsartan (DIOVAN) 160 mg tablet   0    VENTOLIN  (90 Base) MCG/ACT inhaler Inhale 2 puffs every 4 (four) hours as needed  0     No current facility-administered medications for this visit  He is allergic to no active allergies       Review of Systems      A 10 point review systems was conducted, all negative except as noted above HPI  Objective:      /86   Pulse 79   Temp 98 7 °F (37 1 °C)   Ht 6' 3" (1 905 m)   Wt 113 kg (249 lb)   BMI 31 12 kg/m²           Physical Exam   Constitutional: He is oriented to person, place, and time  He appears well-developed and well-nourished  No distress  HENT:   Head: Normocephalic and atraumatic  Eyes: No scleral icterus  Neck: Normal range of motion  No tracheal deviation present  Cardiovascular: Normal rate, regular rhythm and normal heart sounds  Exam reveals no gallop and no friction rub      No murmur heard   Pulmonary/Chest: Breath sounds normal  No respiratory distress  He has no wheezes  He has no rales  He exhibits no tenderness  Abdominal: Soft  Bowel sounds are normal  He exhibits no distension and no mass  There is tenderness (Tenderness overlying the subcutaneous lipoma sites  )  There is no rebound and no guarding  Supraumbilical bulge non reducible likely consistent with periumbilical hernia containing fat from previous laparoscopic surgery  Musculoskeletal: Normal range of motion  He exhibits no edema, tenderness or deformity  Lymphadenopathy:     He has no cervical adenopathy  Neurological: He is alert and oriented to person, place, and time  No cranial nerve deficit  Skin: Skin is warm and dry  No rash noted  He is not diaphoretic  No erythema  No pallor  Three lipomas measuring approximately 2-3 cm noted the abdominal wall  No overlying skin changes  These are tender to palpation   Psychiatric: He has a normal mood and affect  His behavior is normal    Vitals reviewed

## 2019-01-24 NOTE — ASSESSMENT & PLAN NOTE
Patient with history of previous laparoscopic surgery now with a bulge  likely a umbilical hernia containing fat  It is non reducible  Patient like this repaired  I will get an ultrasound for further evaluation and confirmation  In the interim patient will be scheduled for likely umbilical hernia repair in addition to removal of the abdominal lipomas  The procedure self including also she had risk and benefits were discussed the patient great length including bleeding, infection, recurrence, injury to surrounding structures  Patient verbalized understands risks is willing proceed, consent was signed    He will undergo an EKG prior to surgery

## 2019-01-24 NOTE — ASSESSMENT & PLAN NOTE
Patient with multiple lipomas of the torso  He would like to have these removed  Think this is reasonable  Will schedule him for excision of multiple lipomas of the abdominal wall  The procedure self including all associated risks and benefits were discussed at great length  This included bleeding, infection, recurrence  The patient verbalized understands risks is willing to proceed, consent was signed    He will undergo an EKG prior surgery

## 2019-01-24 NOTE — H&P (VIEW-ONLY)
Assessment/Plan:    Umbilical hernia  Patient with history of previous laparoscopic surgery now with a bulge  likely a umbilical hernia containing fat  It is non reducible  Patient like this repaired  I will get an ultrasound for further evaluation and confirmation  In the interim patient will be scheduled for likely umbilical hernia repair in addition to removal of the abdominal lipomas  The procedure self including also she had risk and benefits were discussed the patient great length including bleeding, infection, recurrence, injury to surrounding structures  Patient verbalized understands risks is willing proceed, consent was signed  He will undergo an EKG prior to surgery    Lipoma of torso  Patient with multiple lipomas of the torso  He would like to have these removed  Think this is reasonable  Will schedule him for excision of multiple lipomas of the abdominal wall  The procedure self including all associated risks and benefits were discussed at great length  This included bleeding, infection, recurrence  The patient verbalized understands risks is willing to proceed, consent was signed  He will undergo an EKG prior surgery       Diagnoses and all orders for this visit:    Umbilical hernia without obstruction and without gangrene  -     US abdominal wall; Future    Lipoma of torso          Subjective:      Patient ID: Giuseppe Holland is a 52 y o  male  58-year-old gentle with a history of hypertension, laparoscopic inguinal hernia pair, presents for evaluation potential lipomas and also a bulge around the umbilicus  Patient states he has multiple lipomas on along his abdominal wall which cause him significant discomfort  Addition he has a bulge just above the umbilicus which she has was instructed is lipoma or this was a hernia  Patient denies any overlying skin changes of either the lipoma is or the skin overlying the hernia  Denies any nausea vomiting  No fevers or chills    No chest pain shortness of breath  No changes in bowel bladder habits  The following portions of the patient's history were reviewed and updated as appropriate:   He  has a past medical history of Fractures and Hypertension  He   Patient Active Problem List    Diagnosis Date Noted    Lipoma of torso 39/05/7126    Umbilical hernia 96/17/5124    Shortness of breath      He  has a past surgical history that includes Knee surgery; Hernia repair; and Back surgery  His family history includes Hypertension in his father; No Known Problems in his mother  He  reports that he has never smoked  He has never used smokeless tobacco  He reports that he drinks alcohol  He reports that he uses drugs  Current Outpatient Prescriptions   Medication Sig Dispense Refill    acetaminophen (TYLENOL) 325 mg tablet Take 650 mg by mouth every 6 (six) hours      cetirizine (ZyrTEC) 10 mg tablet Take 10 mg by mouth      diphenhydrAMINE-APAP  MG TABS Take 1 tablet by mouth      ibuprofen (MOTRIN) 200 mg tablet Take 200 mg by mouth      valsartan (DIOVAN) 160 mg tablet   0    VENTOLIN  (90 Base) MCG/ACT inhaler Inhale 2 puffs every 4 (four) hours as needed  0     No current facility-administered medications for this visit  He is allergic to no active allergies       Review of Systems      A 10 point review systems was conducted, all negative except as noted above HPI  Objective:      /86   Pulse 79   Temp 98 7 °F (37 1 °C)   Ht 6' 3" (1 905 m)   Wt 113 kg (249 lb)   BMI 31 12 kg/m²           Physical Exam   Constitutional: He is oriented to person, place, and time  He appears well-developed and well-nourished  No distress  HENT:   Head: Normocephalic and atraumatic  Eyes: No scleral icterus  Neck: Normal range of motion  No tracheal deviation present  Cardiovascular: Normal rate, regular rhythm and normal heart sounds  Exam reveals no gallop and no friction rub      No murmur heard   Pulmonary/Chest: Breath sounds normal  No respiratory distress  He has no wheezes  He has no rales  He exhibits no tenderness  Abdominal: Soft  Bowel sounds are normal  He exhibits no distension and no mass  There is tenderness (Tenderness overlying the subcutaneous lipoma sites  )  There is no rebound and no guarding  Supraumbilical bulge non reducible likely consistent with periumbilical hernia containing fat from previous laparoscopic surgery  Musculoskeletal: Normal range of motion  He exhibits no edema, tenderness or deformity  Lymphadenopathy:     He has no cervical adenopathy  Neurological: He is alert and oriented to person, place, and time  No cranial nerve deficit  Skin: Skin is warm and dry  No rash noted  He is not diaphoretic  No erythema  No pallor  Three lipomas measuring approximately 2-3 cm noted the abdominal wall  No overlying skin changes  These are tender to palpation   Psychiatric: He has a normal mood and affect  His behavior is normal    Vitals reviewed

## 2019-01-24 NOTE — PROGRESS NOTES
Assessment/Plan:    Umbilical hernia  Patient with history of previous laparoscopic surgery now with a bulge  likely a umbilical hernia containing fat  It is non reducible  Patient like this repaired  I will get an ultrasound for further evaluation and confirmation  In the interim patient will be scheduled for likely umbilical hernia repair in addition to removal of the abdominal lipomas  The procedure self including also she had risk and benefits were discussed the patient great length including bleeding, infection, recurrence, injury to surrounding structures  Patient verbalized understands risks is willing proceed, consent was signed  He will undergo an EKG prior to surgery    Lipoma of torso  Patient with multiple lipomas of the torso  He would like to have these removed  Think this is reasonable  Will schedule him for excision of multiple lipomas of the abdominal wall  The procedure self including all associated risks and benefits were discussed at great length  This included bleeding, infection, recurrence  The patient verbalized understands risks is willing to proceed, consent was signed  He will undergo an EKG prior surgery       Diagnoses and all orders for this visit:    Umbilical hernia without obstruction and without gangrene  -     US abdominal wall; Future    Lipoma of torso          Subjective:      Patient ID: Jason Gatica is a 52 y o  male  49-year-old gentle with a history of hypertension, laparoscopic inguinal hernia pair, presents for evaluation potential lipomas and also a bulge around the umbilicus  Patient states he has multiple lipomas on along his abdominal wall which cause him significant discomfort  Addition he has a bulge just above the umbilicus which she has was instructed is lipoma or this was a hernia  Patient denies any overlying skin changes of either the lipoma is or the skin overlying the hernia  Denies any nausea vomiting  No fevers or chills    No chest pain shortness of breath  No changes in bowel bladder habits  The following portions of the patient's history were reviewed and updated as appropriate:   He  has a past medical history of Fractures and Hypertension  He   Patient Active Problem List    Diagnosis Date Noted    Lipoma of torso 31/00/4881    Umbilical hernia 28/60/7961    Shortness of breath      He  has a past surgical history that includes Knee surgery; Hernia repair; and Back surgery  His family history includes Hypertension in his father; No Known Problems in his mother  He  reports that he has never smoked  He has never used smokeless tobacco  He reports that he drinks alcohol  He reports that he uses drugs  Current Outpatient Prescriptions   Medication Sig Dispense Refill    acetaminophen (TYLENOL) 325 mg tablet Take 650 mg by mouth every 6 (six) hours      cetirizine (ZyrTEC) 10 mg tablet Take 10 mg by mouth      diphenhydrAMINE-APAP  MG TABS Take 1 tablet by mouth      ibuprofen (MOTRIN) 200 mg tablet Take 200 mg by mouth      valsartan (DIOVAN) 160 mg tablet   0    VENTOLIN  (90 Base) MCG/ACT inhaler Inhale 2 puffs every 4 (four) hours as needed  0     No current facility-administered medications for this visit  He is allergic to no active allergies       Review of Systems      A 10 point review systems was conducted, all negative except as noted above HPI  Objective:      /86   Pulse 79   Temp 98 7 °F (37 1 °C)   Ht 6' 3" (1 905 m)   Wt 113 kg (249 lb)   BMI 31 12 kg/m²          Physical Exam   Constitutional: He is oriented to person, place, and time  He appears well-developed and well-nourished  No distress  HENT:   Head: Normocephalic and atraumatic  Eyes: No scleral icterus  Neck: Normal range of motion  No tracheal deviation present  Cardiovascular: Normal rate, regular rhythm and normal heart sounds  Exam reveals no gallop and no friction rub      No murmur heard   Pulmonary/Chest: Breath sounds normal  No respiratory distress  He has no wheezes  He has no rales  He exhibits no tenderness  Abdominal: Soft  Bowel sounds are normal  He exhibits no distension and no mass  There is tenderness (Tenderness overlying the subcutaneous lipoma sites  )  There is no rebound and no guarding  Supraumbilical bulge non reducible likely consistent with periumbilical hernia containing fat from previous laparoscopic surgery  Musculoskeletal: Normal range of motion  He exhibits no edema, tenderness or deformity  Lymphadenopathy:     He has no cervical adenopathy  Neurological: He is alert and oriented to person, place, and time  No cranial nerve deficit  Skin: Skin is warm and dry  No rash noted  He is not diaphoretic  No erythema  No pallor  Three lipomas measuring approximately 2-3 cm noted the abdominal wall  No overlying skin changes  These are tender to palpation   Psychiatric: He has a normal mood and affect  His behavior is normal    Vitals reviewed

## 2019-01-25 ENCOUNTER — TELEPHONE (OUTPATIENT)
Dept: SURGERY | Facility: HOSPITAL | Age: 54
End: 2019-01-25

## 2019-01-25 ENCOUNTER — HOSPITAL ENCOUNTER (OUTPATIENT)
Dept: ULTRASOUND IMAGING | Facility: HOSPITAL | Age: 54
Discharge: HOME | End: 2019-01-25
Attending: SURGERY
Payer: COMMERCIAL

## 2019-01-25 ENCOUNTER — HOSPITAL ENCOUNTER (OUTPATIENT)
Dept: NON INVASIVE DIAGNOSTICS | Facility: HOSPITAL | Age: 54
Discharge: HOME | End: 2019-01-25
Attending: SURGERY
Payer: COMMERCIAL

## 2019-01-25 DIAGNOSIS — K42.9 UMBILICAL HERNIA WITHOUT OBSTRUCTION AND WITHOUT GANGRENE: ICD-10-CM

## 2019-01-25 DIAGNOSIS — D17.1 LIPOMA OF TORSO: ICD-10-CM

## 2019-01-25 PROCEDURE — 76705 ECHO EXAM OF ABDOMEN: CPT

## 2019-01-25 PROCEDURE — 93005 ELECTROCARDIOGRAM TRACING: CPT

## 2019-01-25 RX ORDER — OLMESARTAN MEDOXOMIL 20 MG/1
40 TABLET ORAL DAILY
Status: ON HOLD | COMMUNITY
End: 2019-02-13 | Stop reason: DRUGHIGH

## 2019-01-25 NOTE — TELEPHONE ENCOUNTER
Radiology call, pt will need a ct scan, it was  Hard to indetify on ultrasound please place the order and I will precert/

## 2019-01-28 LAB
ATRIAL RATE: 71 BPM
P AXIS: 63 DEGREES
PR INTERVAL: 176 MS
QRS AXIS: 74 DEGREES
QRSD INTERVAL: 94 MS
QT INTERVAL: 382 MS
QTC INTERVAL: 415 MS
T WAVE AXIS: 11 DEGREES
VENTRICULAR RATE: 71 BPM

## 2019-01-28 PROCEDURE — 93010 ELECTROCARDIOGRAM REPORT: CPT | Performed by: INTERNAL MEDICINE

## 2019-01-29 PROBLEM — K42.9 UMBILICAL HERNIA WITHOUT OBSTRUCTION AND WITHOUT GANGRENE: Status: ACTIVE | Noted: 2019-01-29

## 2019-02-05 ENCOUNTER — ANESTHESIA EVENT (OUTPATIENT)
Dept: PERIOP | Facility: HOSPITAL | Age: 54
End: 2019-02-05

## 2019-02-06 ENCOUNTER — ANESTHESIA (OUTPATIENT)
Dept: PERIOP | Facility: HOSPITAL | Age: 54
End: 2019-02-06

## 2019-02-08 NOTE — PRE-PROCEDURE INSTRUCTIONS
Pre-Surgery Instructions:   Medication Instructions    olmesartan (BENICAR) 20 mg tablet Instructed patient per Anesthesia Guidelines   VENTOLIN  (90 Base) MCG/ACT inhaler Instructed patient per Anesthesia Guidelines

## 2019-02-12 ENCOUNTER — ANESTHESIA EVENT (OUTPATIENT)
Dept: PERIOP | Facility: HOSPITAL | Age: 54
End: 2019-02-12
Payer: COMMERCIAL

## 2019-02-12 RX ORDER — OLMESARTAN MEDOXOMIL 40 MG/1
TABLET ORAL DAILY
Refills: 0 | COMMUNITY
Start: 2019-01-28 | End: 2019-03-22 | Stop reason: ALTCHOICE

## 2019-02-13 ENCOUNTER — HOSPITAL ENCOUNTER (OUTPATIENT)
Facility: HOSPITAL | Age: 54
Setting detail: OUTPATIENT SURGERY
Discharge: HOME | End: 2019-02-13
Attending: SURGERY | Admitting: SURGERY
Payer: COMMERCIAL

## 2019-02-13 ENCOUNTER — ANESTHESIA (OUTPATIENT)
Dept: PERIOP | Facility: HOSPITAL | Age: 54
End: 2019-02-13
Payer: COMMERCIAL

## 2019-02-13 VITALS
BODY MASS INDEX: 30.21 KG/M2 | HEART RATE: 100 BPM | RESPIRATION RATE: 18 BRPM | OXYGEN SATURATION: 94 % | DIASTOLIC BLOOD PRESSURE: 66 MMHG | HEIGHT: 75 IN | TEMPERATURE: 98.4 F | WEIGHT: 243 LBS | SYSTOLIC BLOOD PRESSURE: 119 MMHG

## 2019-02-13 DIAGNOSIS — D17.1 LIPOMA OF TORSO: Primary | ICD-10-CM

## 2019-02-13 DIAGNOSIS — K42.9 UMBILICAL HERNIA WITHOUT OBSTRUCTION AND WITHOUT GANGRENE: ICD-10-CM

## 2019-02-13 PROCEDURE — 21931 EXC BACK LES SC 3 CM/>: CPT | Performed by: SURGERY

## 2019-02-13 PROCEDURE — 88304 TISSUE EXAM BY PATHOLOGIST: CPT | Performed by: PATHOLOGY

## 2019-02-13 PROCEDURE — 21930 EXC BACK LES SC < 3 CM: CPT | Performed by: SURGERY

## 2019-02-13 PROCEDURE — 21931 EXC BACK LES SC 3 CM/>: CPT

## 2019-02-13 PROCEDURE — 21930 EXC BACK LES SC < 3 CM: CPT

## 2019-02-13 RX ORDER — GLYCOPYRROLATE 0.2 MG/ML
INJECTION INTRAMUSCULAR; INTRAVENOUS AS NEEDED
Status: DISCONTINUED | OUTPATIENT
Start: 2019-02-13 | End: 2019-02-13 | Stop reason: SURG

## 2019-02-13 RX ORDER — PROPOFOL 10 MG/ML
INJECTION, EMULSION INTRAVENOUS AS NEEDED
Status: DISCONTINUED | OUTPATIENT
Start: 2019-02-13 | End: 2019-02-13 | Stop reason: SURG

## 2019-02-13 RX ORDER — HYDROMORPHONE HCL/PF 1 MG/ML
0.5 SYRINGE (ML) INJECTION
Status: DISCONTINUED | OUTPATIENT
Start: 2019-02-13 | End: 2019-02-13 | Stop reason: HOSPADM

## 2019-02-13 RX ORDER — LIDOCAINE HYDROCHLORIDE 10 MG/ML
INJECTION, SOLUTION INFILTRATION; PERINEURAL AS NEEDED
Status: DISCONTINUED | OUTPATIENT
Start: 2019-02-13 | End: 2019-02-13 | Stop reason: SURG

## 2019-02-13 RX ORDER — FENTANYL CITRATE/PF 50 MCG/ML
25 SYRINGE (ML) INJECTION
Status: DISCONTINUED | OUTPATIENT
Start: 2019-02-13 | End: 2019-02-13 | Stop reason: HOSPADM

## 2019-02-13 RX ORDER — ONDANSETRON 2 MG/ML
INJECTION INTRAMUSCULAR; INTRAVENOUS AS NEEDED
Status: DISCONTINUED | OUTPATIENT
Start: 2019-02-13 | End: 2019-02-13 | Stop reason: SURG

## 2019-02-13 RX ORDER — MIDAZOLAM HYDROCHLORIDE 1 MG/ML
INJECTION INTRAMUSCULAR; INTRAVENOUS AS NEEDED
Status: DISCONTINUED | OUTPATIENT
Start: 2019-02-13 | End: 2019-02-13 | Stop reason: SURG

## 2019-02-13 RX ORDER — OXYCODONE HYDROCHLORIDE AND ACETAMINOPHEN 5; 325 MG/1; MG/1
2 TABLET ORAL EVERY 4 HOURS PRN
Status: DISCONTINUED | OUTPATIENT
Start: 2019-02-13 | End: 2019-02-13 | Stop reason: HOSPADM

## 2019-02-13 RX ORDER — SODIUM CHLORIDE, SODIUM LACTATE, POTASSIUM CHLORIDE, CALCIUM CHLORIDE 600; 310; 30; 20 MG/100ML; MG/100ML; MG/100ML; MG/100ML
125 INJECTION, SOLUTION INTRAVENOUS CONTINUOUS
Status: DISCONTINUED | OUTPATIENT
Start: 2019-02-13 | End: 2019-02-13 | Stop reason: HOSPADM

## 2019-02-13 RX ORDER — KETOROLAC TROMETHAMINE 30 MG/ML
INJECTION, SOLUTION INTRAMUSCULAR; INTRAVENOUS AS NEEDED
Status: DISCONTINUED | OUTPATIENT
Start: 2019-02-13 | End: 2019-02-13 | Stop reason: SURG

## 2019-02-13 RX ORDER — ONDANSETRON 2 MG/ML
4 INJECTION INTRAMUSCULAR; INTRAVENOUS EVERY 8 HOURS PRN
Status: DISCONTINUED | OUTPATIENT
Start: 2019-02-13 | End: 2019-02-13 | Stop reason: HOSPADM

## 2019-02-13 RX ORDER — CHLORHEXIDINE GLUCONATE 4 G/100ML
SOLUTION TOPICAL DAILY PRN
Status: DISCONTINUED | OUTPATIENT
Start: 2019-02-13 | End: 2019-02-13 | Stop reason: HOSPADM

## 2019-02-13 RX ORDER — PROMETHAZINE HYDROCHLORIDE 25 MG/ML
12.5 INJECTION, SOLUTION INTRAMUSCULAR; INTRAVENOUS ONCE AS NEEDED
Status: DISCONTINUED | OUTPATIENT
Start: 2019-02-13 | End: 2019-02-13 | Stop reason: HOSPADM

## 2019-02-13 RX ORDER — CEFAZOLIN SODIUM 2 G/50ML
2000 SOLUTION INTRAVENOUS ONCE
Status: COMPLETED | OUTPATIENT
Start: 2019-02-13 | End: 2019-02-13

## 2019-02-13 RX ORDER — HYDROMORPHONE HYDROCHLORIDE 2 MG/ML
INJECTION, SOLUTION INTRAMUSCULAR; INTRAVENOUS; SUBCUTANEOUS AS NEEDED
Status: DISCONTINUED | OUTPATIENT
Start: 2019-02-13 | End: 2019-02-13 | Stop reason: SURG

## 2019-02-13 RX ORDER — BUPIVACAINE HYDROCHLORIDE 5 MG/ML
INJECTION, SOLUTION PERINEURAL AS NEEDED
Status: DISCONTINUED | OUTPATIENT
Start: 2019-02-13 | End: 2019-02-13 | Stop reason: HOSPADM

## 2019-02-13 RX ORDER — EPHEDRINE SULFATE 50 MG/ML
INJECTION, SOLUTION INTRAVENOUS AS NEEDED
Status: DISCONTINUED | OUTPATIENT
Start: 2019-02-13 | End: 2019-02-13 | Stop reason: SURG

## 2019-02-13 RX ORDER — OXYCODONE HYDROCHLORIDE AND ACETAMINOPHEN 5; 325 MG/1; MG/1
1 TABLET ORAL EVERY 6 HOURS PRN
Qty: 8 TABLET | Refills: 0 | Status: SHIPPED | OUTPATIENT
Start: 2019-02-13 | End: 2019-09-25 | Stop reason: ALTCHOICE

## 2019-02-13 RX ORDER — FENTANYL CITRATE 50 UG/ML
INJECTION, SOLUTION INTRAMUSCULAR; INTRAVENOUS AS NEEDED
Status: DISCONTINUED | OUTPATIENT
Start: 2019-02-13 | End: 2019-02-13 | Stop reason: SURG

## 2019-02-13 RX ADMIN — PROPOFOL 200 MG: 10 INJECTION, EMULSION INTRAVENOUS at 12:09

## 2019-02-13 RX ADMIN — FENTANYL CITRATE 25 MCG: 50 INJECTION, SOLUTION INTRAMUSCULAR; INTRAVENOUS at 12:08

## 2019-02-13 RX ADMIN — KETOROLAC TROMETHAMINE 30 MG: 30 INJECTION, SOLUTION INTRAMUSCULAR at 13:13

## 2019-02-13 RX ADMIN — SODIUM CHLORIDE, SODIUM LACTATE, POTASSIUM CHLORIDE, AND CALCIUM CHLORIDE 125 ML/HR: .6; .31; .03; .02 INJECTION, SOLUTION INTRAVENOUS at 11:28

## 2019-02-13 RX ADMIN — EPHEDRINE SULFATE 20 MG: 50 INJECTION, SOLUTION INTRAMUSCULAR; INTRAVENOUS; SUBCUTANEOUS at 13:13

## 2019-02-13 RX ADMIN — FENTANYL CITRATE 25 MCG: 50 INJECTION, SOLUTION INTRAMUSCULAR; INTRAVENOUS at 12:19

## 2019-02-13 RX ADMIN — FENTANYL CITRATE 25 MCG: 50 INJECTION, SOLUTION INTRAMUSCULAR; INTRAVENOUS at 12:05

## 2019-02-13 RX ADMIN — SODIUM CHLORIDE, SODIUM LACTATE, POTASSIUM CHLORIDE, AND CALCIUM CHLORIDE: .6; .31; .03; .02 INJECTION, SOLUTION INTRAVENOUS at 12:55

## 2019-02-13 RX ADMIN — EPHEDRINE SULFATE 10 MG: 50 INJECTION, SOLUTION INTRAMUSCULAR; INTRAVENOUS; SUBCUTANEOUS at 12:21

## 2019-02-13 RX ADMIN — LIDOCAINE HYDROCHLORIDE 100 MG: 10 INJECTION, SOLUTION INFILTRATION; PERINEURAL at 12:09

## 2019-02-13 RX ADMIN — EPHEDRINE SULFATE 10 MG: 50 INJECTION, SOLUTION INTRAMUSCULAR; INTRAVENOUS; SUBCUTANEOUS at 12:32

## 2019-02-13 RX ADMIN — FENTANYL CITRATE 25 MCG: 50 INJECTION, SOLUTION INTRAMUSCULAR; INTRAVENOUS at 12:29

## 2019-02-13 RX ADMIN — CEFAZOLIN SODIUM 2000 MG: 2 SOLUTION INTRAVENOUS at 11:45

## 2019-02-13 RX ADMIN — EPHEDRINE SULFATE 5 MG: 50 INJECTION, SOLUTION INTRAMUSCULAR; INTRAVENOUS; SUBCUTANEOUS at 12:17

## 2019-02-13 RX ADMIN — ONDANSETRON HYDROCHLORIDE 4 MG: 2 INJECTION, SOLUTION INTRAVENOUS at 12:15

## 2019-02-13 RX ADMIN — DEXAMETHASONE SODIUM PHOSPHATE 10 MG: 10 INJECTION INTRAMUSCULAR; INTRAVENOUS at 12:15

## 2019-02-13 RX ADMIN — MIDAZOLAM HYDROCHLORIDE 2 MG: 1 INJECTION, SOLUTION INTRAMUSCULAR; INTRAVENOUS at 12:00

## 2019-02-13 RX ADMIN — GLYCOPYRROLATE 0.2 MG: 0.2 INJECTION, SOLUTION INTRAMUSCULAR; INTRAVENOUS at 12:00

## 2019-02-13 RX ADMIN — PHENYLEPHRINE HYDROCHLORIDE 20 MCG/MIN: 10 INJECTION, SOLUTION INTRAMUSCULAR; INTRAVENOUS; SUBCUTANEOUS at 12:14

## 2019-02-13 RX ADMIN — EPHEDRINE SULFATE 10 MG: 50 INJECTION, SOLUTION INTRAMUSCULAR; INTRAVENOUS; SUBCUTANEOUS at 12:29

## 2019-02-13 RX ADMIN — HYDROMORPHONE HYDROCHLORIDE 0.5 MG: 2 INJECTION, SOLUTION INTRAMUSCULAR; INTRAVENOUS; SUBCUTANEOUS at 13:03

## 2019-02-13 NOTE — ANESTHESIA POSTPROCEDURE EVALUATION
Post-Op Assessment Note    CV Status:  Stable  Pain Score: 0    Pain management: adequate     Mental Status:  Alert and awake   Hydration Status:  Euvolemic   PONV Controlled:  Controlled   Airway Patency:  Patent   Post Op Vitals Reviewed: Yes      Staff: CRNA           /78 (02/13/19 1330)    Temp 98 5 °F (36 9 °C) (02/13/19 1330)    Pulse 100 (02/13/19 1330)   Resp 16 (02/13/19 1330)    SpO2 96 % (02/13/19 1330)

## 2019-02-13 NOTE — ANESTHESIA PREPROCEDURE EVALUATION
Review of Systems/Medical History  Patient summary reviewed    No history of anesthetic complications     Cardiovascular  EKG reviewed, Exercise tolerance (METS): >4,  Hypertension controlled,    Pulmonary  Shortness of breath,        GI/Hepatic  Negative GI/hepatic ROS          Negative  ROS        Endo/Other    Obesity    GYN       Hematology  Negative hematology ROS      Musculoskeletal  Negative musculoskeletal ROS        Neurology  Negative neurology ROS      Psychology   Negative psychology ROS              Physical Exam    Airway    Mallampati score: I  TM Distance: >3 FB  Neck ROM: full     Dental   Comment: Caps without loose ,     Cardiovascular  Rhythm: regular, Rate: normal,     Pulmonary  Breath sounds clear to auscultation,     Other Findings        Anesthesia Plan  ASA Score- 2     Anesthesia Type- general with ASA Monitors  Additional Monitors:   Airway Plan:         Plan Factors-Patient not instructed to abstain from smoking on day of procedure  Patient did not smoke on day of surgery  Induction- intravenous  Postoperative Plan-   Planned trial extubation    Informed Consent- Anesthetic plan and risks discussed with patient  I personally reviewed this patient with the CRNA  Discussed and agreed on the Anesthesia Plan with the CRNA  Joaquin Francisco

## 2019-02-13 NOTE — INTERIM OP NOTE
EXCISION LIPOMA TORSO  Postoperative Note  PATIENT NAME: Tamara Kemp  : 1965  MRN: [id number]  [facility] 02    Surgery Date: 2019    Preop Diagnosis:  Lipoma of torso [F68 9]  Umbilical hernia without obstruction and without gangrene [K42 9]    Post-Op Diagnosis Codes:     * Lipoma of torso [D17 1]    Procedure(s) (LRB):  EXCISION LIPOMA TORSO (N/A)    Surgeon(s) and Role:      * Garrett Elizalde DO - Primary     * Osmani Collado PA-C - Assisting    Specimens:  ID Type Source Tests Collected by Time Destination   1 : abdominal lipomas Tissue Soft Tissue, Lipoma TISSUE EXAM Garrett Elizalde DO 2019 1238        Estimated Blood Loss:   Minimal    Anesthesia Type:   General     Findings:    Multiple lipomas of torso  Complications:   None    SIGNATURE: Garrett Elizalde DO   DATE: 2019   TIME: 1:08 PM

## 2019-02-13 NOTE — DISCHARGE INSTRUCTIONS
Follow-up with Dr Eliza Gomes in 2 weeks as per appointment  Regular diet as tolerated  Low intensity activity as tolerated, no heavy lifting nothing greater than 20 lb for 2 weeks  Dressing may be removed on Friday  You may shower on Friday  No baths or swimming  If developed fever, worsening pain, redness or drainage the colon was to go to the ER

## 2019-02-14 NOTE — OP NOTE
OPERATIVE REPORT  PATIENT NAME: Giuseppe Holland    :  1965  MRN: [id number]  Pt Location: [facility] 02    SURGERY DATE: 2019    Surgeon(s) and Role: Trevor Carr DO - Primary     * Ivan Ellsworth PA-C - Assisting    Preop Diagnosis:  Lipoma of torso [L65 7]  Umbilical hernia without obstruction and without gangrene [K42 9]    Post-Op Diagnosis Codes:     * Lipoma of torso [S92 4]     * Umbilical hernia without obstruction and without gangrene [K42 9]    Procedure(s) (LRB):  EXCISION LIPOMA TORSO (N/A)    Specimen(s):  ID Type Source Tests Collected by Time Destination   1 : abdominal lipomas Tissue Soft Tissue, Lipoma TISSUE EXAM Jhony Dubois DO 2019 1238        Estimated Blood Loss:   10 mL    Drains:  * No LDAs found *    Anesthesia Type:   General    Operative Indications:  Lipoma of torso [B66 5]  Umbilical hernia without obstruction and without gangrene [K42 9]      Operative Findings:  Multiple lipomas of the torso  1  Left upper chest lipoma measuring approximately 2 cm  2  Left upper quadrant lipoma measuring approximately 3 cm  3  Supraumbilical lipoma measuring approximately 4 cm  4  Right lower quadrant/flank lipoma measuring 4 cm     Complications:   None    Procedure and Technique:  Patient brought to operating arena and placed in supine position operating table  All the regular monitoring devices were then connected  The patient underwent general anesthesia with LMA intubation without complication  Patient received perioperative antibiotics  Patient received bilateral lower sequential potential compression devices for DVT prophylaxis  Patient then prepped draped usual sterile fashion  A time-out was performed to verify correct patient, procedure, position, and site  The right lower quadrant/flank subcutaneous mass was 1st addressed  An oblique incision was mapped out using a surgical marker    This incision was then incised with a 15 blade scalpel taken down through the epidermis into the dermis  Further dissection was then undertaken using Bovie electrocautery through the subcutaneous fat  At this point time a bright yellow lipomatous lesion was noted  This was dissected out using hemostat  It was then grasped with an Allis clamp and elevated  Any adhesions were taken down in the lipomatous lesion was then passed off the field as 1 specimen  Mass measured approximately 4 cm  The cavity was then irrigated with normal saline  Hemostasis was achieved  The dermal layer was then approximated using interrupted 3 0 Vicryl  Skin approximately using a running 4 Monocryl  Attention was then turned to the left upper torso/chest   A 2 cm transverse incision was made over the lesion  Further dissection was then undertaken using Bovie electrocautery through the subcutaneous fat  At this point time a bright yellow lipomatous lesion was noted  This was dissected out using hemostat  It was then grasped with an Allis clamp and elevated  Any adhesions were taken down in the lipomatous lesion was then passed off the field as 1 specimen  Mass measured approximately 2 cm  The cavity was then irrigated with normal saline  Hemostasis was achieved  The dermal layer was then approximated using interrupted 3 0 Vicryl  Skin approximately using a running 4 Monocryl  Attention was then turned to the left upper quadrant subcutaneous mass  A transverse incision was made using 15 blade scalpel measured approximately 3 cm  Further dissection was then undertaken using Bovie electrocautery through the subcutaneous fat  At this point time a bright yellow lipomatous lesion was noted  This was dissected out using hemostat  It was then grasped with an Allis clamp and elevated  Any adhesions were taken down in the lipomatous lesion was then passed off the field as 1 specimen  Mass measured approximately 3 cm  The cavity was then irrigated with normal saline    Hemostasis was achieved  The dermal layer was then approximated using interrupted 3 0 Vicryl  Skin approximately using a running 4 Monocryl  Attention was then turned to the supraumbilical lesion  A transverse incision was made using 15 blade scalpel measuring approximately 3 5 cm in diameter  Further dissection was then undertaken using Bovie electrocautery through the subcutaneous fat  At this point time a bright yellow lipomatous lesion was noted  This was dissected out using hemostat  It was then grasped with an Allis clamp and elevated  Any adhesions were taken down in the lipomatous lesion was then passed off the field as 1 specimen  Specimen measured approximately 4 cm  The cavity was then irrigated with normal saline  Hemostasis was achieved  The dermal layer was then approximated using interrupted 3 0 Vicryl  Skin approximately using a running 4 Monocryl  Steri-Strips and dry sterile dressing were then applied to all incisions  The patient tolerated procedures take the postanesthesia care unit stable condition  All lap, needle, instrument counts were correct  I was present for the entire procedure and A qualified resident physician was not available, DAYNA Mcconnell was required for adequate exposure, retraction, suture the case      Patient Disposition:  PACU     SIGNATURE: Rene Montoya DO  DATE: February 14, 2019  TIME: 4:48 PM

## 2019-02-22 ENCOUNTER — TELEPHONE (OUTPATIENT)
Dept: OBGYN CLINIC | Facility: CLINIC | Age: 54
End: 2019-02-22

## 2019-02-22 ENCOUNTER — TELEPHONE (OUTPATIENT)
Dept: OBGYN CLINIC | Facility: HOSPITAL | Age: 54
End: 2019-02-22

## 2019-02-22 DIAGNOSIS — M17.12 PRIMARY OSTEOARTHRITIS OF LEFT KNEE: Primary | ICD-10-CM

## 2019-02-22 DIAGNOSIS — M17.11 PRIMARY OSTEOARTHRITIS OF RIGHT KNEE: ICD-10-CM

## 2019-02-22 NOTE — TELEPHONE ENCOUNTER
Chelle Lundberg is on scheduled 2/22/19 for a gel injection and wants to know if it is in the office because he has to leave work early and there is a $65 co pay  Please call his girlfriend Stefanie Bahena @ [phone]  Spoke to office who referred to Christie Skinner but she was not in office    Thank you

## 2019-02-22 NOTE — TELEPHONE ENCOUNTER
Patient sees Dr Estrellita Thomas  He is scheduled for a gel injection today  His girlfriend Melissa Josue is calling to state that the pharmacy never called to get it shipped to the office, or didn't get a letter in the mail like they did the last time  She wants to be sure that we have it before patient gets out of work early to come to the appt today      Please advise    AV#[phone]

## 2019-03-04 ENCOUNTER — OFFICE VISIT (OUTPATIENT)
Dept: SURGERY | Facility: HOSPITAL | Age: 54
End: 2019-03-04

## 2019-03-04 VITALS
SYSTOLIC BLOOD PRESSURE: 126 MMHG | WEIGHT: 240 LBS | DIASTOLIC BLOOD PRESSURE: 76 MMHG | TEMPERATURE: 98.3 F | BODY MASS INDEX: 29.84 KG/M2 | HEIGHT: 75 IN | HEART RATE: 66 BPM

## 2019-03-04 DIAGNOSIS — D17.1 LIPOMA OF TORSO: Primary | ICD-10-CM

## 2019-03-04 PROCEDURE — 99024 POSTOP FOLLOW-UP VISIT: CPT | Performed by: SURGERY

## 2019-03-04 NOTE — ASSESSMENT & PLAN NOTE
Status post excision of multiple lipomas of torso  Doing well  Incisions healed well  Pathology reviewed and discussed the patient  Follow-up zoila Vo

## 2019-03-04 NOTE — PROGRESS NOTES
Assessment/Plan:    Lipoma of torso  Status post excision of multiple lipomas of torso  Doing well  Incisions healed well  Pathology reviewed and discussed the patient  Follow-up zoila Anderson Diagnoses and all orders for this visit:    Lipoma of torso          Subjective:      Patient ID: Lindy Arias is a 52 y o  male  52year-old gentleman status post excision of multiple lipomas of the torso  Doing well  No nausea vomiting  No fevers or chills  Patient states incisions have been healing well  No redness or drainage noted  The following portions of the patient's history were reviewed and updated as appropriate:   He  has a past medical history of Fractures, Hypertension, and Pneumonia  He   Patient Active Problem List    Diagnosis Date Noted    Umbilical hernia without obstruction and without gangrene 98/72/9729    Umbilical hernia 75/27/4809    Shortness of breath      He  has a past surgical history that includes Knee surgery; Knee arthroscopy (Bilateral); Hernia repair (Bilateral); Back surgery; and pr exc skin benig >4 cm trunk,arm,leg (N/A, 2/13/2019)  His family history includes Hypertension in his father; No Known Problems in his mother  He  reports that he has quit smoking  He has quit using smokeless tobacco  He reports that he has current or past drug history  Drug: Marijuana  He reports that he does not drink alcohol    Current Outpatient Medications   Medication Sig Dispense Refill    cetirizine (ZyrTEC) 10 mg tablet Take 10 mg by mouth      diphenhydrAMINE-APAP  MG TABS Take 1 tablet by mouth      olmesartan (BENICAR) 40 mg tablet daily   0    oxyCODONE-acetaminophen (PERCOCET) 5-325 mg per tablet Take 1 tablet by mouth every 6 (six) hours as needed for severe painMax Daily Amount: 4 tablets 8 tablet 0    valsartan (DIOVAN) 160 mg tablet   0    VENTOLIN  (90 Base) MCG/ACT inhaler Inhale 2 puffs every 4 (four) hours as needed  0     No current facility-administered medications for this visit  He is allergic to no active allergies       Review of Systems   Constitutional: Negative  Gastrointestinal: Negative  Skin: Negative for color change, pallor, rash and wound  Objective:      /76   Pulse 66   Temp 98 3 °F (36 8 °C)   Ht 6' 3" (1 905 m)   Wt 109 kg (240 lb)   BMI 30 00 kg/m²          Physical Exam   Constitutional: He appears well-developed and well-nourished  No distress  Skin: Skin is warm  No rash noted  He is not diaphoretic  No erythema  No pallor  For incisions throughout the torso are clean, dry, intact  No evidence of active drainage or erythema noted  Nontender  Overall healing well  Vitals reviewed

## 2019-03-11 ENCOUNTER — TELEPHONE (OUTPATIENT)
Dept: OBGYN CLINIC | Facility: HOSPITAL | Age: 54
End: 2019-03-11

## 2019-03-22 ENCOUNTER — OFFICE VISIT (OUTPATIENT)
Dept: FAMILY MEDICINE CLINIC | Facility: CLINIC | Age: 54
End: 2019-03-22
Payer: COMMERCIAL

## 2019-03-22 ENCOUNTER — OFFICE VISIT (OUTPATIENT)
Dept: OBGYN CLINIC | Facility: CLINIC | Age: 54
End: 2019-03-22
Payer: COMMERCIAL

## 2019-03-22 VITALS
WEIGHT: 228 LBS | HEIGHT: 75 IN | DIASTOLIC BLOOD PRESSURE: 82 MMHG | BODY MASS INDEX: 28.35 KG/M2 | HEART RATE: 76 BPM | SYSTOLIC BLOOD PRESSURE: 129 MMHG

## 2019-03-22 VITALS
HEIGHT: 75 IN | SYSTOLIC BLOOD PRESSURE: 110 MMHG | DIASTOLIC BLOOD PRESSURE: 66 MMHG | BODY MASS INDEX: 28.75 KG/M2 | WEIGHT: 231.2 LBS

## 2019-03-22 DIAGNOSIS — Z12.11 SCREENING FOR COLON CANCER: ICD-10-CM

## 2019-03-22 DIAGNOSIS — Z12.5 SCREENING FOR PROSTATE CANCER: ICD-10-CM

## 2019-03-22 DIAGNOSIS — Z13.220 SCREENING FOR HYPERLIPIDEMIA: ICD-10-CM

## 2019-03-22 DIAGNOSIS — N52.9 ERECTILE DYSFUNCTION, UNSPECIFIED ERECTILE DYSFUNCTION TYPE: ICD-10-CM

## 2019-03-22 DIAGNOSIS — E66.3 OVERWEIGHT (BMI 25.0-29.9): ICD-10-CM

## 2019-03-22 DIAGNOSIS — M17.12 PRIMARY OSTEOARTHRITIS OF LEFT KNEE: Primary | ICD-10-CM

## 2019-03-22 DIAGNOSIS — I10 ESSENTIAL HYPERTENSION: Primary | ICD-10-CM

## 2019-03-22 DIAGNOSIS — M17.11 PRIMARY OSTEOARTHRITIS OF RIGHT KNEE: ICD-10-CM

## 2019-03-22 PROBLEM — M17.10 OSTEOARTHRITIS OF KNEE: Status: ACTIVE | Noted: 2019-03-22

## 2019-03-22 PROBLEM — M25.569 KNEE PAIN: Status: ACTIVE | Noted: 2019-03-22

## 2019-03-22 PROBLEM — M17.9 OSTEOARTHRITIS OF KNEE: Status: ACTIVE | Noted: 2019-03-22

## 2019-03-22 PROCEDURE — 3008F BODY MASS INDEX DOCD: CPT | Performed by: PHYSICIAN ASSISTANT

## 2019-03-22 PROCEDURE — 3074F SYST BP LT 130 MM HG: CPT | Performed by: PHYSICIAN ASSISTANT

## 2019-03-22 PROCEDURE — 20610 DRAIN/INJ JOINT/BURSA W/O US: CPT | Performed by: ORTHOPAEDIC SURGERY

## 2019-03-22 PROCEDURE — 1036F TOBACCO NON-USER: CPT | Performed by: PHYSICIAN ASSISTANT

## 2019-03-22 PROCEDURE — 3078F DIAST BP <80 MM HG: CPT | Performed by: PHYSICIAN ASSISTANT

## 2019-03-22 PROCEDURE — 99203 OFFICE O/P NEW LOW 30 MIN: CPT | Performed by: PHYSICIAN ASSISTANT

## 2019-03-22 RX ORDER — METHOCARBAMOL 750 MG/1
750 TABLET, FILM COATED ORAL
COMMUNITY
End: 2020-01-28 | Stop reason: SDUPTHER

## 2019-03-22 RX ORDER — LISINOPRIL 40 MG/1
40 TABLET ORAL DAILY
COMMUNITY
End: 2019-03-22 | Stop reason: SINTOL

## 2019-03-22 RX ORDER — LEVOFLOXACIN 500 MG/1
500 TABLET, FILM COATED ORAL DAILY
Refills: 0 | COMMUNITY
Start: 2019-03-15 | End: 2019-09-25 | Stop reason: ALTCHOICE

## 2019-03-22 RX ORDER — SILDENAFIL 100 MG/1
100 TABLET, FILM COATED ORAL DAILY PRN
COMMUNITY
End: 2019-03-22 | Stop reason: SDUPTHER

## 2019-03-22 RX ORDER — SILDENAFIL 100 MG/1
100 TABLET, FILM COATED ORAL DAILY PRN
Qty: 10 TABLET | Refills: 0 | Status: SHIPPED | OUTPATIENT
Start: 2019-03-22 | End: 2019-06-11 | Stop reason: SDUPTHER

## 2019-03-22 RX ORDER — IRBESARTAN 150 MG/1
150 TABLET ORAL
Qty: 30 TABLET | Refills: 0 | Status: SHIPPED | OUTPATIENT
Start: 2019-03-22 | End: 2019-04-13 | Stop reason: SDUPTHER

## 2019-03-22 NOTE — PROGRESS NOTES
Assessment/Plan:     Diagnoses and all orders for this visit:    Essential hypertension  -     CBC and Platelet; Future  -     Lipid panel; Future  -     Comprehensive metabolic panel; Future  -     TSH, 3rd generation with Free T4 reflex; Future  -     irbesartan (AVAPRO) 150 mg tablet; Take 1 tablet (150 mg total) by mouth daily at bedtime    Screening for colon cancer  -     Ambulatory referral to Gastroenterology; Future    Screening for prostate cancer  -     PSA Total, Diagnostic; Future    Screening for hyperlipidemia  -     Lipid panel; Future    Erectile dysfunction, unspecified erectile dysfunction type  -     sildenafil (VIAGRA) 100 mg tablet; Take 1 tablet (100 mg total) by mouth daily as needed for erectile dysfunction    Other orders  -     levofloxacin (LEVAQUIN) 500 mg tablet; Take 500 mg by mouth daily  -     Discontinue: lisinopril (ZESTRIL) 40 mg tablet; Take 40 mg by mouth daily  -     Discontinue: sildenafil (VIAGRA) 100 mg tablet; Take 100 mg by mouth daily as needed for erectile dysfunction  -     methocarbamol (ROBAXIN) 750 mg tablet; Take 750 mg by mouth daily at bedtime          Will switch him from lisinopril to irbesartan to see if it relieves dry cough  Ordered labs  Refilled viagra  He will come in next week for a blood pressure check  Follow up in 6 months or sooner if needed  Subjective:      Patient ID: Ricardo Hook is a 52 y o  male  Maureenlacy Vo is a 52year old male who is here today to establish care  He was taking valsartan, but had to be switched due to the recall  He was switched to benicar, but it is on back order  He was then switched to lisinopril  About one month ago, he noticed that he has a chronic dry cough  He was put on levofloxacin about one week ago by his old PCP, but he states that it did not help  He still has the same cough  He also is concerned because he has been experiencing erectile dysfunction since being switched to lisinopril   His last PCP gave him Viagra which he has been using and is requesting a refill  He is currently following with ortho for his back and bilateral knee pain  He has a history of a thoracic spinal fusion  He denies any runny nose, sore throat, sinus congestion, chest pains, or shortness of breath  The following portions of the patient's history were reviewed and updated as appropriate:   He  has a past medical history of Bilateral leg weakness, Fractures, Hypertension, and Pneumonia  He   Patient Active Problem List    Diagnosis Date Noted    Osteoarthritis of knee 03/22/2019    Knee pain 28/67/0793    Umbilical hernia without obstruction and without gangrene 31/62/1306    Umbilical hernia 68/50/7627    Shortness of breath     Lower back pain 11/13/2012     He  has a past surgical history that includes Knee surgery; Knee arthroscopy (Bilateral); Hernia repair (Bilateral); Back surgery; and pr exc skin benig >4 cm trunk,arm,leg (N/A, 2/13/2019)  His family history includes Hypertension in his father; No Known Problems in his family and mother  He  reports that he has quit smoking  He has quit using smokeless tobacco  He reports that he has current or past drug history  Drug: Marijuana  He reports that he does not drink alcohol    Current Outpatient Medications   Medication Sig Dispense Refill    cetirizine (ZyrTEC) 10 mg tablet Take 10 mg by mouth      diphenhydrAMINE-APAP  MG TABS Take 1 tablet by mouth      levofloxacin (LEVAQUIN) 500 mg tablet Take 500 mg by mouth daily  0    methocarbamol (ROBAXIN) 750 mg tablet Take 750 mg by mouth daily at bedtime      sildenafil (VIAGRA) 100 mg tablet Take 1 tablet (100 mg total) by mouth daily as needed for erectile dysfunction 10 tablet 0    VENTOLIN  (90 Base) MCG/ACT inhaler Inhale 2 puffs every 4 (four) hours as needed  0    irbesartan (AVAPRO) 150 mg tablet Take 1 tablet (150 mg total) by mouth daily at bedtime 30 tablet 0    oxyCODONE-acetaminophen (PERCOCET) 5-325 mg per tablet Take 1 tablet by mouth every 6 (six) hours as needed for severe painMax Daily Amount: 4 tablets (Patient not taking: Reported on 3/22/2019) 8 tablet 0     No current facility-administered medications for this visit  Current Outpatient Medications on File Prior to Visit   Medication Sig    cetirizine (ZyrTEC) 10 mg tablet Take 10 mg by mouth    diphenhydrAMINE-APAP  MG TABS Take 1 tablet by mouth    levofloxacin (LEVAQUIN) 500 mg tablet Take 500 mg by mouth daily    methocarbamol (ROBAXIN) 750 mg tablet Take 750 mg by mouth daily at bedtime    VENTOLIN  (90 Base) MCG/ACT inhaler Inhale 2 puffs every 4 (four) hours as needed    [DISCONTINUED] lisinopril (ZESTRIL) 40 mg tablet Take 40 mg by mouth daily    [DISCONTINUED] sildenafil (VIAGRA) 100 mg tablet Take 100 mg by mouth daily as needed for erectile dysfunction    oxyCODONE-acetaminophen (PERCOCET) 5-325 mg per tablet Take 1 tablet by mouth every 6 (six) hours as needed for severe painMax Daily Amount: 4 tablets (Patient not taking: Reported on 3/22/2019)    [DISCONTINUED] olmesartan (BENICAR) 40 mg tablet daily     [DISCONTINUED] valsartan (DIOVAN) 160 mg tablet      No current facility-administered medications on file prior to visit  He is allergic to no active allergies       Review of Systems   Constitutional: Negative for chills, diaphoresis, fatigue and fever  HENT: Negative for congestion, ear pain, postnasal drip, rhinorrhea, sneezing, sore throat and trouble swallowing  Eyes: Negative for pain and visual disturbance  Respiratory: Positive for cough (dry)  Negative for apnea, shortness of breath and wheezing  Cardiovascular: Negative for chest pain and palpitations  Gastrointestinal: Negative for abdominal pain, constipation, diarrhea, nausea and vomiting  Genitourinary: Negative for dysuria and hematuria     Musculoskeletal: Positive for arthralgias (bilateral knee pain) and back pain  Negative for gait problem and myalgias  Neurological: Negative for dizziness, syncope, weakness, light-headedness, numbness and headaches  Psychiatric/Behavioral: Negative for suicidal ideas  The patient is not nervous/anxious  Objective:    /66 (BP Location: Left arm, Patient Position: Sitting)   Ht 6' 3" (1 905 m)   Wt 105 kg (231 lb 3 2 oz)   BMI 28 90 kg/m²      Physical Exam   Constitutional: He is oriented to person, place, and time  He appears well-developed and well-nourished  HENT:   Head: Normocephalic and atraumatic  Right Ear: Tympanic membrane, external ear and ear canal normal    Left Ear: Tympanic membrane, external ear and ear canal normal    Nose: Nose normal    Mouth/Throat: Oropharynx is clear and moist and mucous membranes are normal  No oropharyngeal exudate, posterior oropharyngeal edema or posterior oropharyngeal erythema  Eyes: Pupils are equal, round, and reactive to light  EOM are normal    Neck: Normal range of motion  Neck supple  Cardiovascular: Normal rate, regular rhythm and normal heart sounds  Exam reveals no gallop and no friction rub  No murmur heard  Pulmonary/Chest: Effort normal and breath sounds normal  No respiratory distress  He has no wheezes  He has no rales  Musculoskeletal: Normal range of motion  Lymphadenopathy:     He has no cervical adenopathy  Neurological: He is alert and oriented to person, place, and time  Skin: Skin is warm and dry  Psychiatric: He has a normal mood and affect  His behavior is normal  Judgment and thought content normal    Vitals reviewed  BMI Counseling: Body mass index is 28 9 kg/m²  Discussed the patient's BMI with him  The BMI is above average  BMI counseling and education was provided to the patient  Nutrition recommendations include reducing portion sizes and decreasing overall calorie intake  Exercise recommendations include exercising 3-5 times per week

## 2019-03-22 NOTE — PROGRESS NOTES
Chief Complaint  Bilateral knee pain due to osteoarthritis    History Of Presenting Illness  Waleska Howard 1965 presents with bilateral knee pain due to osteoarthritis  Patient presents for Synvisc-One injection  Patient has joined [employer] and managed to lose 20 lb weight so far      Current Medications  Current Outpatient Medications   Medication Sig Dispense Refill    cetirizine (ZyrTEC) 10 mg tablet Take 10 mg by mouth      diphenhydrAMINE-APAP  MG TABS Take 1 tablet by mouth      irbesartan (AVAPRO) 150 mg tablet Take 1 tablet (150 mg total) by mouth daily at bedtime 30 tablet 0    levofloxacin (LEVAQUIN) 500 mg tablet Take 500 mg by mouth daily  0    methocarbamol (ROBAXIN) 750 mg tablet Take 750 mg by mouth daily at bedtime      oxyCODONE-acetaminophen (PERCOCET) 5-325 mg per tablet Take 1 tablet by mouth every 6 (six) hours as needed for severe painMax Daily Amount: 4 tablets 8 tablet 0    sildenafil (VIAGRA) 100 mg tablet Take 1 tablet (100 mg total) by mouth daily as needed for erectile dysfunction 10 tablet 0    VENTOLIN  (90 Base) MCG/ACT inhaler Inhale 2 puffs every 4 (four) hours as needed  0     No current facility-administered medications for this visit          Current Problems    Active Problems:   Patient Active Problem List    Diagnosis Date Noted    Osteoarthritis of knee 03/22/2019    Knee pain 59/83/3600    Umbilical hernia without obstruction and without gangrene 79/03/3037    Umbilical hernia 13/21/5036    Shortness of breath     Lower back pain 11/13/2012         Review of Systems:    General: negative for - chills, fatigue, fever,  weight gain or weight loss  Psychological: negative for - anxiety, behavioral disorder, concentration difficulties      Past Medical History:   Past Medical History:   Diagnosis Date    Bilateral leg weakness     Fractures     Hypertension     Pneumonia        Past Surgical History:   Past Surgical History: Procedure Laterality Date    BACK SURGERY      thoracic    HERNIA REPAIR Bilateral     inguinal    KNEE ARTHROSCOPY Bilateral     KNEE SURGERY      FL EXC SKIN BENIG >4 CM TRUNK,ARM,LEG N/A 2/13/2019    Procedure: EXCISION LIPOMA TORSO;  Surgeon: Stephanie Khan DO;  Location: [facility];  Service: General       Family History:  Family history reviewed and non-contributory  Family History   Problem Relation Age of Onset    No Known Problems Mother     Hypertension Father     No Known Problems Family        Social History:  Social History     Socioeconomic History    Marital status:      Spouse name: None    Number of children: None    Years of education: None    Highest education level: None   Occupational History    Occupation:    Social Needs    Financial resource strain: None    Food insecurity:     Worry: None     Inability: None    Transportation needs:     Medical: None     Non-medical: None   Tobacco Use    Smoking status: Former Smoker    Smokeless tobacco: Former User    Tobacco comment: quit 15 yrs ago   Substance and Sexual Activity    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    Drug use: Not Currently     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    Sexual activity: Yes     Comment: Denied: History of sexually active with persons at risk for HIV-related disease - As per [employer]    Lifestyle    Physical activity:     Days per week: None     Minutes per session: None    Stress: None   Relationships    Social connections:     Talks on phone: None     Gets together: None     Attends Gnosticist service: None     Active member of club or organization: None     Attends meetings of clubs or organizations: None     Relationship status: None    Intimate partner violence:     Fear of current or ex partner: None     Emotionally abused: None     Physically abused: None     Forced sexual activity: None   Other Topics Concern    None   Social History Narrative    Denied: History of drug use - As per [employer]       Allergies:    Allergies   Allergen Reactions    No Active Allergies            Physical ExaminationBP 129/82   Pulse 76   Ht 6' 3" (1 905 m)   Wt 103 kg (228 lb)   BMI 28 50 kg/m²   Gen: Alert and oriented to person, place, time  HEENT: EOMI, eyes clear, moist mucus membranes, hearing intact      Orthopedic Exam  Both knees 0-110 degree flexion with medial joint line tenderness  Radiographs show medial compartment osteoarthritis          Impression  Bilateral knee osteoarthritis            Plan    Discussed treatment options with the patient  Both knees injected with Synvisc-One  Follow-up in 6 months for repeat injection  Large joint arthrocentesis: L knee  Date/Time: 3/22/2019 3:37 PM  Consent given by: patient  Site marked: site marked  Timeout: Immediately prior to procedure a time out was called to verify the correct patient, procedure, equipment, support staff and site/side marked as required   Supporting Documentation  Indications: pain and joint swelling   Procedure Details  Location: knee - L knee  Preparation: Patient was prepped and draped in the usual sterile fashion  Needle size: 22 G  Ultrasound guidance: no  Approach: anterolateral  Medications administered: 48 mg hylan 48 MG/6ML    Patient tolerance: patient tolerated the procedure well with no immediate complications  Dressing:  Sterile dressing applied    Large joint arthrocentesis: R knee  Date/Time: 3/22/2019 3:38 PM  Consent given by: patient  Site marked: site marked  Timeout: Immediately prior to procedure a time out was called to verify the correct patient, procedure, equipment, support staff and site/side marked as required   Supporting Documentation  Indications: pain and joint swelling   Procedure Details  Location: knee - R knee  Preparation: Patient was prepped and draped in the usual sterile fashion  Needle size: 22 G  Ultrasound guidance: no  Approach: anterolateral  Medications administered: 48 mg hylan 48 MG/6ML    Patient tolerance: patient tolerated the procedure well with no immediate complications  Dressing:  Sterile dressing applied          Vangie Weston MD        Portions of the record may have been created with voice recognition software   Occasional wrong word or "sound a like" substitutions may have occurred due to the inherent limitations of voice recognition software   Read the chart carefully and recognize, using context, where substitutions have occurred

## 2019-03-22 NOTE — PATIENT INSTRUCTIONS
Decision Aid for Knee Osteoarthritis   AMBULATORY CARE:   What you need to know about decisions for knee osteoarthritis:  You can help make decisions about being screened for osteoarthritis  You can also help plan treatment if osteoarthritis is found with screening, or you develop it  Osteoarthritis screening is a test done to find osteoarthritis early  Screening is different from diagnosis because screening is used when you first start to have signs or symptoms  This means management or treatment can start early to help prevent joint damage  Treatments include medicine and surgery, but you can also manage osteoarthritis with lifestyle changes  What you need to know about knee osteoarthritis:   · Osteoarthritis is a condition that causes tissue between bones to wear away  This can happen slowly over time or quickly because of an injury  The bones rub together when you move  This causes pain and can limit mobility  · Osteoarthritis is the most common form of arthritis  About 30 million people in the [place] have osteoarthritis  · Osteoarthritis is more common in women than in men  Other risk factors include being overweight or having a family history of osteoarthritis  · Talk to your healthcare provider if you think you have signs or symptoms of osteoarthritis  Examples include joint pain with movement, tender or stiff joint, or a grating feeling with movement  You may also feel hard lumps around the joint, called bone spurs  How to know if you are a good candidate for osteoarthritis screening:  Screening may be helpful for you if any of the following is true:  · You are 50 years or older  · You are overweight or obese  · You do work or play a sport that puts repeated stress on your knee joint  · You have a family history of osteoarthritis  · You had a knee joint injury, even if it happened many years ago  · You were born with a joint or cartilage problem      · You have mild signs or symptoms of osteoarthritis  · Your symptoms are starting to affect your ability to do your daily activities  How osteoarthritis screening is done:  No test is used to diagnose osteoarthritis  Your healthcare provider will check the following:  · How much pain, tenderness, or inflammation you have in the joint    · Range of motion in the joint (how far it can move in every direction)    · Your ability to walk without pain, or how your symptoms changed the way you walk    · The strength of muscles around the joint  Benefits and risks of screening:  Talk with your healthcare provider about the risks and benefits of screening:  · Benefits  include finding osteoarthritis early, when you can manage the condition  You may be able to slow the progress of joint damage by losing weight or exercising more  You can also help make a treatment plan that you can change over time as needed  · Risks  include a false belief that you will not develop osteoarthritis  Your screening test may find that you do not have osteoarthritis  This can be because signs and symptoms are mild  You can still develop more severe signs and symptoms over time  It is important to talk with your healthcare provider about how often to have screening  Questions to ask your healthcare provider to help you make decisions about screening:   · How high is my risk for osteoarthritis? · How often do I need to have screening? · Where is the screening done? · Do I need to do anything to get ready to have screening? What happens after osteoarthritis screening: You will meet with your healthcare provider to go over the results of your screening  You, your family or caregiver, and your healthcare provider can talk about your treatment options  Together you can decide which treatment is right for you   You may need more tests to diagnose anything that showed up on the screening test  Common tests include an x-ray or CT scan to check the amount of tissue between bones or to find bone spurs  How osteoarthritis is treated, and the benefits of treatment:   · Lifestyle changes  include weight loss and exercise  Weight loss can help take pressure off the joint  Exercise can help build muscles around the joint  This helps build stability and strength  Your healthcare provider, a dietitian, or a physical therapist can help you make nutrition and exercise plans  · Devices  can help you stay active and relieve your symptoms  Shoe inserts support your joints and take pressure off the joints  This can help reduce pain that happens when you stand or walk  A knee brace can add stability to your knee when you walk  A cane can help take weight off your knee as you walk  · Medicines  include acetaminophen, NSAIDs, and certain antidepressants  NSAIDs, such as ibuprofen, and acetaminophen can be taken as a pill or rubbed into your skin in a cream  Acetaminophen and NSAIDs help relieve pain  NSAIDs also help reduce inflammation  Both medicines are available without a prescription  Do not take these medicines without talking to your healthcare provider first  Stronger forms of these medicines are available with a prescription  Antidepressants are only available with a prescription  · Therapy  includes physical and occupational therapy  A physical therapist can help you strengthen muscles around the joint and increase your range of motion  An occupational therapist can help you find easier ways to do your daily activities  For example, you may be shown how to climb stairs without putting stress on your knee  · Steroid medicine  may be injected into the knee area if your symptoms become worse  This can reduce inflammation and relieve pain  · Surgery  may be done if other treatments do not work and your symptoms become severe  Debridement is surgery to clean pieces of bone and cartilage out of the joint  This is done if you have knee buckling or locking   Osteotomy is surgery to move bones into a different position so they do not rub against other bones  Surgery called arthroplasty is used to remove the damaged joint and replace it with an artificial joint  Surgery can relieve pain by removing the problem that is causing your pain  Risks of osteoarthritis treatment:   · Lifestyle changes  will not reverse the damage to the tissue between your joints  It may prevent symptoms from getting worse  It may also be difficult to exercise if you have severe knee pain  · Medicines  must be taken in limited doses  Acetaminophen can cause liver damage, and NSAIDs can cause kidney damage  The pain may come back before you can take another dose  Antidepressants can cause certain side effects  · Steroid injections  are usually limited to about 4 each year  This is because the medicine can cause joint damage over time  Steroids can also increase your risk for infections, cause changes in your mood, and increase blood sugar levels  · Surgery  increases your risk for an infection or blood clot  Nerves, blood vessels, or tissues around the knee can be damaged during an osteotomy  An artificial joint may wear out over time  It may also become loose  It will need to be replaced if it wears out or comes loose  You will need to do knee exercises to prevent scar tissue from building up in your knee  This can be painful  Questions to ask your healthcare provider to help you make decisions about treatment:   · How will I know if signs and symptoms are becoming severe enough to need treatment? · How long will it take for each treatment option to help my knee pain get better? · Which pain medicines will work best for me? · Am I a good candidate for steroid injections? · Am I a good candidate for knee replacement surgery? · How long is recovery from knee replacement surgery? · Will I need to have more surgery over time?     · How often will I need to do knee exercises to prevent scar tissue from building up? © 2017 [facility] Information is for End User's use only and may not be sold, redistributed or otherwise used for commercial purposes  All illustrations and images included in CareNotes® are the copyrighted property of A D A M [employer]  or Azar Carty  The above information is an  only  It is not intended as medical advice for individual conditions or treatments  Talk to your doctor, nurse or pharmacist before following any medical regimen to see if it is safe and effective for you

## 2019-04-13 DIAGNOSIS — I10 ESSENTIAL HYPERTENSION: ICD-10-CM

## 2019-04-15 ENCOUNTER — APPOINTMENT (OUTPATIENT)
Dept: LAB | Facility: MEDICAL CENTER | Age: 54
End: 2019-04-15
Payer: COMMERCIAL

## 2019-04-15 VITALS — DIASTOLIC BLOOD PRESSURE: 80 MMHG | SYSTOLIC BLOOD PRESSURE: 122 MMHG

## 2019-04-15 DIAGNOSIS — Z12.5 SCREENING FOR PROSTATE CANCER: ICD-10-CM

## 2019-04-15 DIAGNOSIS — I10 ESSENTIAL HYPERTENSION: ICD-10-CM

## 2019-04-15 DIAGNOSIS — Z13.220 SCREENING FOR HYPERLIPIDEMIA: ICD-10-CM

## 2019-04-15 LAB
ALBUMIN SERPL BCP-MCNC: 4.4 G/DL (ref 3.5–5)
ALP SERPL-CCNC: 78 U/L (ref 46–116)
ALT SERPL W P-5'-P-CCNC: 37 U/L (ref 12–78)
ANION GAP SERPL CALCULATED.3IONS-SCNC: 3 MMOL/L (ref 4–13)
AST SERPL W P-5'-P-CCNC: 25 U/L (ref 5–45)
BILIRUB SERPL-MCNC: 0.64 MG/DL (ref 0.2–1)
BUN SERPL-MCNC: 13 MG/DL (ref 5–25)
CALCIUM SERPL-MCNC: 8.7 MG/DL (ref 8.3–10.1)
CHLORIDE SERPL-SCNC: 107 MMOL/L (ref 100–108)
CHOLEST SERPL-MCNC: 252 MG/DL (ref 50–200)
CO2 SERPL-SCNC: 27 MMOL/L (ref 21–32)
CREAT SERPL-MCNC: 0.97 MG/DL (ref 0.6–1.3)
ERYTHROCYTE [DISTWIDTH] IN BLOOD BY AUTOMATED COUNT: 13.2 % (ref 11.6–15.1)
GFR SERPL CREATININE-BSD FRML MDRD: 88 ML/MIN/1.73SQ M
GLUCOSE P FAST SERPL-MCNC: 93 MG/DL (ref 65–99)
HCT VFR BLD AUTO: 43 % (ref 36.5–49.3)
HDLC SERPL-MCNC: 44 MG/DL (ref 40–60)
HGB BLD-MCNC: 14.6 G/DL (ref 12–17)
LDLC SERPL CALC-MCNC: 180 MG/DL (ref 0–100)
MCH RBC QN AUTO: 31.1 PG (ref 26.8–34.3)
MCHC RBC AUTO-ENTMCNC: 34 G/DL (ref 31.4–37.4)
MCV RBC AUTO: 92 FL (ref 82–98)
NONHDLC SERPL-MCNC: 208 MG/DL
PLATELET # BLD AUTO: 280 THOUSANDS/UL (ref 149–390)
PMV BLD AUTO: 10 FL (ref 8.9–12.7)
POTASSIUM SERPL-SCNC: 4.1 MMOL/L (ref 3.5–5.3)
PROT SERPL-MCNC: 7.2 G/DL (ref 6.4–8.2)
PSA SERPL-MCNC: 1 NG/ML (ref 0–4)
RBC # BLD AUTO: 4.7 MILLION/UL (ref 3.88–5.62)
SODIUM SERPL-SCNC: 137 MMOL/L (ref 136–145)
TRIGL SERPL-MCNC: 139 MG/DL
TSH SERPL DL<=0.05 MIU/L-ACNC: 3.17 UIU/ML (ref 0.36–3.74)
WBC # BLD AUTO: 5.91 THOUSAND/UL (ref 4.31–10.16)

## 2019-04-15 PROCEDURE — 36415 COLL VENOUS BLD VENIPUNCTURE: CPT

## 2019-04-15 PROCEDURE — 85027 COMPLETE CBC AUTOMATED: CPT

## 2019-04-15 PROCEDURE — 80061 LIPID PANEL: CPT

## 2019-04-15 PROCEDURE — 80053 COMPREHEN METABOLIC PANEL: CPT

## 2019-04-15 PROCEDURE — 84153 ASSAY OF PSA TOTAL: CPT

## 2019-04-15 PROCEDURE — 84443 ASSAY THYROID STIM HORMONE: CPT

## 2019-04-15 RX ORDER — IRBESARTAN 150 MG/1
150 TABLET ORAL
Qty: 30 TABLET | Refills: 0 | Status: SHIPPED | OUTPATIENT
Start: 2019-04-15 | End: 2019-05-05 | Stop reason: SDUPTHER

## 2019-04-15 RX ORDER — IRBESARTAN 150 MG/1
150 TABLET ORAL
Qty: 90 TABLET | Refills: 1 | Status: CANCELLED | OUTPATIENT
Start: 2019-04-15

## 2019-04-16 DIAGNOSIS — E78.2 MIXED HYPERLIPIDEMIA: Primary | ICD-10-CM

## 2019-04-16 RX ORDER — ATORVASTATIN CALCIUM 20 MG/1
20 TABLET, FILM COATED ORAL DAILY
Qty: 30 TABLET | Refills: 2 | Status: SHIPPED | OUTPATIENT
Start: 2019-04-16 | End: 2019-04-18 | Stop reason: SDUPTHER

## 2019-04-18 ENCOUNTER — PREP FOR PROCEDURE (OUTPATIENT)
Dept: GASTROENTEROLOGY | Facility: MEDICAL CENTER | Age: 54
End: 2019-04-18

## 2019-04-18 ENCOUNTER — TELEPHONE (OUTPATIENT)
Dept: GASTROENTEROLOGY | Facility: MEDICAL CENTER | Age: 54
End: 2019-04-18

## 2019-04-18 DIAGNOSIS — E78.2 MIXED HYPERLIPIDEMIA: ICD-10-CM

## 2019-04-18 DIAGNOSIS — Z12.11 SCREENING FOR COLON CANCER: Primary | ICD-10-CM

## 2019-04-18 RX ORDER — ATORVASTATIN CALCIUM 20 MG/1
20 TABLET, FILM COATED ORAL DAILY
Qty: 30 TABLET | Refills: 2 | Status: SHIPPED | OUTPATIENT
Start: 2019-04-18 | End: 2019-07-25 | Stop reason: SDUPTHER

## 2019-05-05 DIAGNOSIS — I10 ESSENTIAL HYPERTENSION: ICD-10-CM

## 2019-05-06 ENCOUNTER — TELEPHONE (OUTPATIENT)
Dept: GASTROENTEROLOGY | Facility: MEDICAL CENTER | Age: 54
End: 2019-05-06

## 2019-05-06 RX ORDER — IRBESARTAN 150 MG/1
150 TABLET ORAL
Qty: 30 TABLET | Refills: 3 | Status: SHIPPED | OUTPATIENT
Start: 2019-05-06 | End: 2019-09-25 | Stop reason: SDUPTHER

## 2019-06-10 DIAGNOSIS — N52.9 ERECTILE DYSFUNCTION, UNSPECIFIED ERECTILE DYSFUNCTION TYPE: ICD-10-CM

## 2019-06-11 RX ORDER — SILDENAFIL 100 MG/1
100 TABLET, FILM COATED ORAL DAILY PRN
Qty: 30 TABLET | Refills: 0 | Status: SHIPPED | OUTPATIENT
Start: 2019-06-11 | End: 2020-05-15 | Stop reason: SDUPTHER

## 2019-07-25 ENCOUNTER — TELEPHONE (OUTPATIENT)
Dept: FAMILY MEDICINE CLINIC | Facility: CLINIC | Age: 54
End: 2019-07-25

## 2019-07-25 DIAGNOSIS — E78.2 MIXED HYPERLIPIDEMIA: ICD-10-CM

## 2019-07-25 RX ORDER — ATORVASTATIN CALCIUM 20 MG/1
TABLET, FILM COATED ORAL
Qty: 30 TABLET | Refills: 0 | Status: SHIPPED | OUTPATIENT
Start: 2019-07-25 | End: 2019-08-25 | Stop reason: SDUPTHER

## 2019-07-25 NOTE — TELEPHONE ENCOUNTER
Please let patient know that I gave a 30 day supply of Lipitor as he is due for his labs to be redrawn

## 2019-08-12 ENCOUNTER — TELEPHONE (OUTPATIENT)
Dept: OBGYN CLINIC | Facility: HOSPITAL | Age: 54
End: 2019-08-12

## 2019-08-12 NOTE — TELEPHONE ENCOUNTER
Caller: Patients Wife- Teodora Gathers  Call Back Number: [phone]  Provider: Dr Alisa Rutherford has called and asked if they may request another round of "Chicken Shot" and would like to know if they may schedule for the next week     Please advise, thank you

## 2019-08-13 ENCOUNTER — TELEPHONE (OUTPATIENT)
Dept: OBGYN CLINIC | Facility: CLINIC | Age: 54
End: 2019-08-13

## 2019-08-13 DIAGNOSIS — M17.12 PRIMARY OSTEOARTHRITIS OF LEFT KNEE: Primary | ICD-10-CM

## 2019-08-13 DIAGNOSIS — M17.11 PRIMARY OSTEOARTHRITIS OF RIGHT KNEE: ICD-10-CM

## 2019-08-13 NOTE — TELEPHONE ENCOUNTER
Left message on phone that patient must wait till 09/22 for next series of shots    Lisbet Millan will put in new order to start authorization  process

## 2019-08-25 DIAGNOSIS — E78.2 MIXED HYPERLIPIDEMIA: ICD-10-CM

## 2019-08-26 ENCOUNTER — LAB (OUTPATIENT)
Dept: LAB | Facility: MEDICAL CENTER | Age: 54
End: 2019-08-26
Payer: COMMERCIAL

## 2019-08-26 DIAGNOSIS — E78.2 MIXED HYPERLIPIDEMIA: ICD-10-CM

## 2019-08-26 LAB
ALBUMIN SERPL BCP-MCNC: 4.1 G/DL (ref 3.5–5)
ALP SERPL-CCNC: 84 U/L (ref 46–116)
ALT SERPL W P-5'-P-CCNC: 37 U/L (ref 12–78)
ANION GAP SERPL CALCULATED.3IONS-SCNC: 7 MMOL/L (ref 4–13)
AST SERPL W P-5'-P-CCNC: 21 U/L (ref 5–45)
BILIRUB SERPL-MCNC: 0.56 MG/DL (ref 0.2–1)
BUN SERPL-MCNC: 14 MG/DL (ref 5–25)
CALCIUM SERPL-MCNC: 8.9 MG/DL (ref 8.3–10.1)
CHLORIDE SERPL-SCNC: 106 MMOL/L (ref 100–108)
CHOLEST SERPL-MCNC: 127 MG/DL (ref 50–200)
CO2 SERPL-SCNC: 28 MMOL/L (ref 21–32)
CREAT SERPL-MCNC: 0.93 MG/DL (ref 0.6–1.3)
GFR SERPL CREATININE-BSD FRML MDRD: 91 ML/MIN/1.73SQ M
GLUCOSE P FAST SERPL-MCNC: 92 MG/DL (ref 65–99)
HDLC SERPL-MCNC: 42 MG/DL (ref 40–60)
LDLC SERPL CALC-MCNC: 58 MG/DL (ref 0–100)
NONHDLC SERPL-MCNC: 85 MG/DL
POTASSIUM SERPL-SCNC: 4.2 MMOL/L (ref 3.5–5.3)
PROT SERPL-MCNC: 6.5 G/DL (ref 6.4–8.2)
SODIUM SERPL-SCNC: 141 MMOL/L (ref 136–145)
TRIGL SERPL-MCNC: 136 MG/DL

## 2019-08-26 PROCEDURE — 80061 LIPID PANEL: CPT

## 2019-08-26 PROCEDURE — 36415 COLL VENOUS BLD VENIPUNCTURE: CPT

## 2019-08-26 PROCEDURE — 80053 COMPREHEN METABOLIC PANEL: CPT

## 2019-08-26 RX ORDER — ATORVASTATIN CALCIUM 20 MG/1
TABLET, FILM COATED ORAL
Qty: 30 TABLET | Refills: 0 | Status: SHIPPED | OUTPATIENT
Start: 2019-08-26 | End: 2019-09-21 | Stop reason: SDUPTHER

## 2019-09-11 ENCOUNTER — TELEPHONE (OUTPATIENT)
Dept: OBGYN CLINIC | Facility: HOSPITAL | Age: 54
End: 2019-09-11

## 2019-09-11 NOTE — TELEPHONE ENCOUNTER
TO BE COMPLETED BY CENTRAL AUTH TEAM:     Physician: DR Tammie St    Medication: SYNVISC-ONE    Number of Injections in Series (Appointments scheduled 1 week apart from one another): 1    Schedule after this date: 9/15/19    Billing Info: Buy and Bill/Specialty Pharmacy-Patient Supply>> BUY&BILL    Appointment Message Line: GISELLA KNEE SYNVISC-ONE BUY&BILL  (please copy and paste appointment message line when scheduling appointment)    Additional Comments:

## 2019-09-16 NOTE — TELEPHONE ENCOUNTER
TO BE COMPLETED BY :     Office location appointment scheduled: [facility]    Date of First Appointment scheduled: 09/18/19    Additional Comments:

## 2019-09-18 ENCOUNTER — OFFICE VISIT (OUTPATIENT)
Dept: OBGYN CLINIC | Facility: CLINIC | Age: 54
End: 2019-09-18
Payer: COMMERCIAL

## 2019-09-18 VITALS
BODY MASS INDEX: 28.23 KG/M2 | HEART RATE: 67 BPM | DIASTOLIC BLOOD PRESSURE: 87 MMHG | WEIGHT: 227 LBS | HEIGHT: 75 IN | SYSTOLIC BLOOD PRESSURE: 144 MMHG

## 2019-09-18 DIAGNOSIS — M17.12 PRIMARY OSTEOARTHRITIS OF LEFT KNEE: ICD-10-CM

## 2019-09-18 DIAGNOSIS — M17.11 PRIMARY OSTEOARTHRITIS OF RIGHT KNEE: Primary | ICD-10-CM

## 2019-09-18 PROCEDURE — 20610 DRAIN/INJ JOINT/BURSA W/O US: CPT | Performed by: ORTHOPAEDIC SURGERY

## 2019-09-18 NOTE — PATIENT INSTRUCTIONS
Precautions after Total Joint Replacement Surgery   WHAT YOU NEED TO KNOW:   Precautions after total joint replacement surgery are safety measures  Safety measures are guidelines to help your joint heal properly and help you avoid injury after surgery  DISCHARGE INSTRUCTIONS:   Weight on the new joint:  Right after surgery, your new joint is not able to hold your weight  Your healthcare provider will tell you how much weight you can put on your joint  Over time, your healthcare provider will increase the amount of weight your joint can bear  Your joint can loosen and move out of place if you put too much weight on it  Follow instructions on how much weight you should put on your joint  Use crutches, a cane, or a walker to help you move around more easily and help prevent a fall  Climbing stairs:  Climb stairs one at a time  Step up with the same foot each time  Do not switch feet for each stair  You may need to use crutches or a cane to help you go up or down stairs  Always follow safety measures for climbing stairs  Body positioning:   · Sit in chairs that have seats at least as high as your knees  · Use a special seat extender to raise your toilet seat at least as high as your knees  · Do not cross or twist your legs  · Do not bend at your waist to pick things up  Use a tool to help you grab objects  · Do not sit for longer than 1 hour at a time  · Do not lie on the side of your body that had surgery  · Follow instructions about using pillows to help position your body  Activity:  Ask your healthcare provider when you can resume your normal daily activities  Ask when you can start to exercise and what exercises are best for you  Avoid activities and exercise that cause joint pain  You may need to see a physical or occupational therapist  These therapists teach you how to safely move with your new joint   They teach you activities and exercises that help make your bones and muscles stronger  Follow up with your healthcare provider as directed:  Write down your questions so you remember to ask them during your visits  Contact your healthcare provider if:   · You develop bruises  · You have questions or concerns about your condition or care  Return to the emergency department if:   · You fall  · You have severe pain  · One or both of your legs are red, warm, and swollen  © 2017 [address] Information is for End User's use only and may not be sold, redistributed or otherwise used for commercial purposes  All illustrations and images included in CareNotes® are the copyrighted property of [employer] M [employer]  or [employer]  The above information is an  only  It is not intended as medical advice for individual conditions or treatments  Talk to your doctor, nurse or pharmacist before following any medical regimen to see if it is safe and effective for you  Joint Replacement Surgery, Ambulatory Care   GENERAL INFORMATION:   What do I need to know about joint replacement surgery? A joint that is damaged by injury or disease can be removed and replaced with a new one  There are times when only a part of the joint needs to be replaced or repaired  Your healthcare provider may try other treatments before joint replacement surgery, such as steroid injections or medicines  Pain relief and increased function are the goals of joint replacement  Knee, hip, and shoulder joints are the most common joints replaced  Joints in your elbows, fingers, and ankles can also be repaired or replaced  How do I prepare for joint replacement surgery? Your healthcare provider will talk to you about how to prepare for surgery  He may tell you not to eat or drink anything after midnight on the day of your surgery  He will tell you what medicines to take or not take on the day of your surgery  What will happen during joint replacement surgery?   You may be given medicine to keep you asleep and pain free during your surgery  You may be given an injection of medicine that stops the pain in the area of surgery  Your healthcare provider will remove the damaged joint and replace it with a new one  Your new joint may be made out of metal, plastic, or other materials  Your new joint may be cemented into place if you are older or do limited activities  It may also be cemented if your bones are weak or of poor quality  A drain may be placed to remove extra blood and fluids from the surgery area  Your incision will be closed with stitches or staples and covered with a bandage  What will happen after joint replacement surgery? you may need to stay in the hospital for a few days  You will need to wear pressure stockings to help prevent blood clots in your legs  You may need support devices, such as a walker, crutches, or wheel chair  You may have an immobilizer, splint, brace, or cast  You may need physical therapy even after being discharged from the hospital    What are the risks of joint replacement surgery? Your risks of infection, bleeding, and blood clots increase with surgery  You may be allergic to the material used in your new joint  Nerves, muscles, tendons, and blood vessels near your joint may become damaged during surgery  The new joint may loosen or come out of the socket  The materials used to make your new joint may wear thin or loosen  You may need another surgery if you have any of these problems  CARE AGREEMENT:   You have the right to help plan your care  Learn about your health condition and how it may be treated  Discuss treatment options with your caregivers to decide what care you want to receive  You always have the right to refuse treatment  The above information is an  only  It is not intended as medical advice for individual conditions or treatments   Talk to your doctor, nurse or pharmacist before following any medical regimen to see if it is safe and effective for you  © 2014 [address] is for End User's use only and may not be sold, redistributed or otherwise used for commercial purposes  All illustrations and images included in CareNotes® are the copyrighted property of [employer]  or [employer]ar [employer]  Decision Aid for Knee Osteoarthritis   AMBULATORY CARE:   What you need to know about decisions for knee osteoarthritis:  You can help make decisions about being screened for osteoarthritis  You can also help plan treatment if osteoarthritis is found with screening, or you develop it  Osteoarthritis screening is a test done to find osteoarthritis early  Screening is different from diagnosis because screening is used when you first start to have signs or symptoms  This means management or treatment can start early to help prevent joint damage  Treatments include medicine and surgery, but you can also manage osteoarthritis with lifestyle changes  What you need to know about knee osteoarthritis:   · Osteoarthritis is a condition that causes tissue between bones to wear away  This can happen slowly over time or quickly because of an injury  The bones rub together when you move  This causes pain and can limit mobility  · Osteoarthritis is the most common form of arthritis  About 30 million people in the [place] have osteoarthritis  · Osteoarthritis is more common in women than in men  Other risk factors include being overweight or having a family history of osteoarthritis  · Talk to your healthcare provider if you think you have signs or symptoms of osteoarthritis  Examples include joint pain with movement, tender or stiff joint, or a grating feeling with movement  You may also feel hard lumps around the joint, called bone spurs  How to know if you are a good candidate for osteoarthritis screening:  Screening may be helpful for you if any of the following is true:  · You are 50 years or older      · You are overweight or obese  · You do work or play a sport that puts repeated stress on your knee joint  · You have a family history of osteoarthritis  · You had a knee joint injury, even if it happened many years ago  · You were born with a joint or cartilage problem  · You have mild signs or symptoms of osteoarthritis  · Your symptoms are starting to affect your ability to do your daily activities  How osteoarthritis screening is done:  No test is used to diagnose osteoarthritis  Your healthcare provider will check the following:  · How much pain, tenderness, or inflammation you have in the joint    · Range of motion in the joint (how far it can move in every direction)    · Your ability to walk without pain, or how your symptoms changed the way you walk    · The strength of muscles around the joint  Benefits and risks of screening:  Talk with your healthcare provider about the risks and benefits of screening:  · Benefits  include finding osteoarthritis early, when you can manage the condition  You may be able to slow the progress of joint damage by losing weight or exercising more  You can also help make a treatment plan that you can change over time as needed  · Risks  include a false belief that you will not develop osteoarthritis  Your screening test may find that you do not have osteoarthritis  This can be because signs and symptoms are mild  You can still develop more severe signs and symptoms over time  It is important to talk with your healthcare provider about how often to have screening  Questions to ask your healthcare provider to help you make decisions about screening:   · How high is my risk for osteoarthritis? · How often do I need to have screening? · Where is the screening done? · Do I need to do anything to get ready to have screening? What happens after osteoarthritis screening: You will meet with your healthcare provider to go over the results of your screening   You, your family or caregiver, and your healthcare provider can talk about your treatment options  Together you can decide which treatment is right for you  You may need more tests to diagnose anything that showed up on the screening test  Common tests include an x-ray or CT scan to check the amount of tissue between bones or to find bone spurs  How osteoarthritis is treated, and the benefits of treatment:   · Lifestyle changes  include weight loss and exercise  Weight loss can help take pressure off the joint  Exercise can help build muscles around the joint  This helps build stability and strength  Your healthcare provider, a dietitian, or a physical therapist can help you make nutrition and exercise plans  · Devices  can help you stay active and relieve your symptoms  Shoe inserts support your joints and take pressure off the joints  This can help reduce pain that happens when you stand or walk  A knee brace can add stability to your knee when you walk  A cane can help take weight off your knee as you walk  · Medicines  include acetaminophen, NSAIDs, and certain antidepressants  NSAIDs, such as ibuprofen, and acetaminophen can be taken as a pill or rubbed into your skin in a cream  Acetaminophen and NSAIDs help relieve pain  NSAIDs also help reduce inflammation  Both medicines are available without a prescription  Do not take these medicines without talking to your healthcare provider first  Stronger forms of these medicines are available with a prescription  Antidepressants are only available with a prescription  · Therapy  includes physical and occupational therapy  A physical therapist can help you strengthen muscles around the joint and increase your range of motion  An occupational therapist can help you find easier ways to do your daily activities  For example, you may be shown how to climb stairs without putting stress on your knee      · Steroid medicine  may be injected into the knee area if your symptoms become worse  This can reduce inflammation and relieve pain  · Surgery  may be done if other treatments do not work and your symptoms become severe  Debridement is surgery to clean pieces of bone and cartilage out of the joint  This is done if you have knee buckling or locking  Osteotomy is surgery to move bones into a different position so they do not rub against other bones  Surgery called arthroplasty is used to remove the damaged joint and replace it with an artificial joint  Surgery can relieve pain by removing the problem that is causing your pain  Risks of osteoarthritis treatment:   · Lifestyle changes  will not reverse the damage to the tissue between your joints  It may prevent symptoms from getting worse  It may also be difficult to exercise if you have severe knee pain  · Medicines  must be taken in limited doses  Acetaminophen can cause liver damage, and NSAIDs can cause kidney damage  The pain may come back before you can take another dose  Antidepressants can cause certain side effects  · Steroid injections  are usually limited to about 4 each year  This is because the medicine can cause joint damage over time  Steroids can also increase your risk for infections, cause changes in your mood, and increase blood sugar levels  · Surgery  increases your risk for an infection or blood clot  Nerves, blood vessels, or tissues around the knee can be damaged during an osteotomy  An artificial joint may wear out over time  It may also become loose  It will need to be replaced if it wears out or comes loose  You will need to do knee exercises to prevent scar tissue from building up in your knee  This can be painful  Questions to ask your healthcare provider to help you make decisions about treatment:   · How will I know if signs and symptoms are becoming severe enough to need treatment? · How long will it take for each treatment option to help my knee pain get better?     · Which pain medicines will work best for me? · Am I a good candidate for steroid injections? · Am I a good candidate for knee replacement surgery? · How long is recovery from knee replacement surgery? · Will I need to have more surgery over time? · How often will I need to do knee exercises to prevent scar tissue from building up? © 2017 [address]  Information is for End User's use only and may not be sold, redistributed or otherwise used for commercial purposes  All illustrations and images included in CareNotes® are the copyrighted property of [employer]  or [employer]ar Carty  The above information is an  only  It is not intended as medical advice for individual conditions or treatments  Talk to your doctor, nurse or pharmacist before following any medical regimen to see if it is safe and effective for you

## 2019-09-21 DIAGNOSIS — E78.2 MIXED HYPERLIPIDEMIA: ICD-10-CM

## 2019-09-23 RX ORDER — ATORVASTATIN CALCIUM 20 MG/1
TABLET, FILM COATED ORAL
Qty: 90 TABLET | Refills: 1 | Status: SHIPPED | OUTPATIENT
Start: 2019-09-23 | End: 2019-09-25 | Stop reason: SDUPTHER

## 2019-09-25 ENCOUNTER — OFFICE VISIT (OUTPATIENT)
Dept: FAMILY MEDICINE CLINIC | Facility: CLINIC | Age: 54
End: 2019-09-25
Payer: COMMERCIAL

## 2019-09-25 VITALS
WEIGHT: 232.8 LBS | BODY MASS INDEX: 28.95 KG/M2 | HEIGHT: 75 IN | DIASTOLIC BLOOD PRESSURE: 90 MMHG | SYSTOLIC BLOOD PRESSURE: 142 MMHG

## 2019-09-25 DIAGNOSIS — E78.2 MIXED HYPERLIPIDEMIA: ICD-10-CM

## 2019-09-25 DIAGNOSIS — Z23 NEED FOR INFLUENZA VACCINATION: ICD-10-CM

## 2019-09-25 DIAGNOSIS — I10 ESSENTIAL HYPERTENSION: Primary | ICD-10-CM

## 2019-09-25 PROCEDURE — 90682 RIV4 VACC RECOMBINANT DNA IM: CPT | Performed by: PHYSICIAN ASSISTANT

## 2019-09-25 PROCEDURE — 90471 IMMUNIZATION ADMIN: CPT | Performed by: PHYSICIAN ASSISTANT

## 2019-09-25 PROCEDURE — 99213 OFFICE O/P EST LOW 20 MIN: CPT | Performed by: PHYSICIAN ASSISTANT

## 2019-09-25 RX ORDER — ATORVASTATIN CALCIUM 20 MG/1
20 TABLET, FILM COATED ORAL DAILY
Qty: 90 TABLET | Refills: 1 | Status: SHIPPED | OUTPATIENT
Start: 2019-09-25 | End: 2019-09-25 | Stop reason: SDUPTHER

## 2019-09-25 RX ORDER — IRBESARTAN 150 MG/1
150 TABLET ORAL
Qty: 90 TABLET | Refills: 1 | Status: SHIPPED | OUTPATIENT
Start: 2019-09-25 | End: 2019-09-25 | Stop reason: SDUPTHER

## 2019-09-25 RX ORDER — IRBESARTAN 150 MG/1
150 TABLET ORAL
Qty: 90 TABLET | Refills: 1 | Status: SHIPPED | OUTPATIENT
Start: 2019-09-25 | End: 2019-10-05 | Stop reason: SDUPTHER

## 2019-09-25 RX ORDER — ATORVASTATIN CALCIUM 20 MG/1
20 TABLET, FILM COATED ORAL DAILY
Qty: 90 TABLET | Refills: 1 | Status: SHIPPED | OUTPATIENT
Start: 2019-09-25 | End: 2020-01-14 | Stop reason: SDUPTHER

## 2019-10-05 DIAGNOSIS — I10 ESSENTIAL HYPERTENSION: ICD-10-CM

## 2019-10-07 RX ORDER — IRBESARTAN 150 MG/1
TABLET ORAL
Qty: 90 TABLET | Refills: 1 | Status: SHIPPED | OUTPATIENT
Start: 2019-10-07 | End: 2020-01-14 | Stop reason: SDUPTHER

## 2020-01-14 DIAGNOSIS — E78.2 MIXED HYPERLIPIDEMIA: ICD-10-CM

## 2020-01-14 DIAGNOSIS — I10 ESSENTIAL HYPERTENSION: ICD-10-CM

## 2020-01-14 DIAGNOSIS — J45.20 MILD INTERMITTENT REACTIVE AIRWAY DISEASE WITHOUT COMPLICATION: Primary | ICD-10-CM

## 2020-01-14 RX ORDER — IRBESARTAN 150 MG/1
150 TABLET ORAL
Qty: 90 TABLET | Refills: 1 | Status: SHIPPED | OUTPATIENT
Start: 2020-01-14 | End: 2020-05-13 | Stop reason: SDUPTHER

## 2020-01-14 RX ORDER — ATORVASTATIN CALCIUM 20 MG/1
20 TABLET, FILM COATED ORAL DAILY
Qty: 90 TABLET | Refills: 1 | Status: SHIPPED | OUTPATIENT
Start: 2020-01-14 | End: 2020-08-24

## 2020-01-28 ENCOUNTER — OFFICE VISIT (OUTPATIENT)
Dept: FAMILY MEDICINE CLINIC | Facility: CLINIC | Age: 55
End: 2020-01-28
Payer: COMMERCIAL

## 2020-01-28 VITALS
TEMPERATURE: 97.2 F | WEIGHT: 229.8 LBS | BODY MASS INDEX: 28.57 KG/M2 | DIASTOLIC BLOOD PRESSURE: 72 MMHG | HEIGHT: 75 IN | HEART RATE: 82 BPM | OXYGEN SATURATION: 98 % | SYSTOLIC BLOOD PRESSURE: 132 MMHG

## 2020-01-28 DIAGNOSIS — E78.2 MIXED HYPERLIPIDEMIA: ICD-10-CM

## 2020-01-28 DIAGNOSIS — Z91.09 ENVIRONMENTAL ALLERGIES: ICD-10-CM

## 2020-01-28 DIAGNOSIS — I10 ESSENTIAL HYPERTENSION: ICD-10-CM

## 2020-01-28 DIAGNOSIS — Z00.00 ANNUAL PHYSICAL EXAM: Primary | ICD-10-CM

## 2020-01-28 DIAGNOSIS — G89.29 CHRONIC BILATERAL LOW BACK PAIN WITHOUT SCIATICA: ICD-10-CM

## 2020-01-28 DIAGNOSIS — M54.50 CHRONIC BILATERAL LOW BACK PAIN WITHOUT SCIATICA: ICD-10-CM

## 2020-01-28 PROCEDURE — 99396 PREV VISIT EST AGE 40-64: CPT | Performed by: PHYSICIAN ASSISTANT

## 2020-01-28 RX ORDER — METHOCARBAMOL 750 MG/1
750 TABLET, FILM COATED ORAL
Qty: 30 TABLET | Refills: 0 | Status: SHIPPED | OUTPATIENT
Start: 2020-01-28 | End: 2020-05-14 | Stop reason: SDUPTHER

## 2020-01-28 RX ORDER — MONTELUKAST SODIUM 10 MG/1
10 TABLET ORAL
Qty: 90 TABLET | Refills: 0 | Status: SHIPPED | OUTPATIENT
Start: 2020-01-28 | End: 2020-04-06 | Stop reason: SDUPTHER

## 2020-01-28 NOTE — PROGRESS NOTES
[facility]    NAME: Renea Magana  AGE: 62 y o  SEX: male  : 1965     DATE: 2020     Assessment and Plan:     Problem List Items Addressed This Visit        Cardiovascular and Mediastinum    Essential hypertension       Other    Lower back pain    Relevant Medications    methocarbamol (ROBAXIN) 750 mg tablet    Mixed hyperlipidemia      Other Visit Diagnoses     Annual physical exam    -  Primary    Environmental allergies        Relevant Medications    montelukast (SINGULAIR) 10 mg tablet          Immunizations and preventive care screenings were discussed with patient today  Appropriate education was printed on patient's after visit summary  Counseling:  Dental Health: discussed importance of regular tooth brushing, flossing, and dental visits  · Exercise: the importance of regular exercise/physical activity was discussed  Recommend exercise 3-5 times per week for at least 30 minutes  We will try adding on Singular to see if that helps with allergies  Suggested trying not to let cats sleep close to him  Blood pressure well controlled  Continue same medications  Agreed to give small amount of Robaxin to help with muscle spasms at night  He is going to continue seeing chiropractor  Suggested doing PFTs to evaluate lung function, but he would like to wait at this time and see what it would cost him before repeating this test and seeing if it is more of an allergic trigger  I think this is appropriate and we will reassess in 6 months at his next follow-up  Return in about 6 months (around 2020)  Chief Complaint:     Chief Complaint   Patient presents with    Annual Exam     check up      History of Present Illness:     Adult Annual Physical   Patient here for a comprehensive physical exam  The patient reports problems - allergies and low back spasms   He states that he has been getting spasms at night  He was taking Robaxin only as needed, which does help  He is asking if he can have a refill  He has been going to the chiropractor, which is helping  He has also noticed that in the morning he wakes up with nasal congestion, runny nose, and coughing  He has an allergy to cats, and his girlfriend does have cats that sleep in bed with them  He states that first thing in the morning he has to use his inhaler, which does help  He tried using his girlfriend's Anoro inhaler, which did provide some relief  He was tested for COPD a few years ago, but his testing was normal  He also noticed that he has been getting more short of breath walking up hills  However, he did recently gain some weight  He does not feel short of breath at rest or while walking  He denies any chest pains  Diet and Physical Activity  · Diet/Nutrition: limited junk food  · Exercise: moderate cardiovascular exercise  Depression Screening  PHQ-9 Depression Screening    PHQ-9:    Frequency of the following problems over the past two weeks:       Little interest or pleasure in doing things:  0 - not at all  Feeling down, depressed, or hopeless:  0 - not at all  PHQ-2 Score:  0       General Health  · Sleep: sleeps well  · Hearing: normal - bilateral   · Vision: most recent eye exam >1 year ago  · Dental: no dental visits for >1 year and brushes teeth twice daily   Health  · Symptoms include: erectile dysfunction     Review of Systems:     Review of Systems   Constitutional: Negative for chills, diaphoresis, fatigue and fever  HENT: Positive for postnasal drip  Negative for congestion, ear pain, rhinorrhea, sneezing, sore throat and trouble swallowing  Eyes: Negative for pain and visual disturbance  Respiratory: Positive for cough and shortness of breath  Negative for apnea and wheezing  Cardiovascular: Negative for chest pain and palpitations     Gastrointestinal: Negative for abdominal pain, constipation, diarrhea, nausea and vomiting  Genitourinary: Negative for dysuria and hematuria  Musculoskeletal: Positive for arthralgias and back pain  Negative for gait problem and myalgias  Neurological: Negative for dizziness, syncope, weakness, light-headedness, numbness and headaches  Psychiatric/Behavioral: Negative for suicidal ideas  The patient is not nervous/anxious         Past Medical History:     Past Medical History:   Diagnosis Date    Bilateral leg weakness     Fractures     Hypertension     Pneumonia       Past Surgical History:     Past Surgical History:   Procedure Laterality Date    BACK SURGERY      thoracic    HERNIA REPAIR Bilateral     inguinal    KNEE ARTHROSCOPY Bilateral     KNEE SURGERY      AK EXC SKIN BENIG >4 CM TRUNK,ARM,LEG N/A 2/13/2019    Procedure: EXCISION LIPOMA TORSO;  Surgeon: Tessie Frank DO;  Location: [facility];  Service: General      Family History:     Family History   Problem Relation Age of Onset    No Known Problems Mother     Hypertension Father     No Known Problems Family       Social History:     Social History     Socioeconomic History    Marital status:      Spouse name: None    Number of children: None    Years of education: None    Highest education level: None   Occupational History    Occupation:    Social Needs    Financial resource strain: None    Food insecurity:     Worry: None     Inability: None    Transportation needs:     Medical: None     Non-medical: None   Tobacco Use    Smoking status: Former Smoker    Smokeless tobacco: Former User    Tobacco comment: quit 15 yrs ago   Substance and Sexual Activity    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    Drug use: Not Currently     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    Sexual activity: Yes     Comment: Denied: History of sexually active with persons at risk for HIV-related disease - As per [employer]    Lifestyle    Physical activity:     Days per week: None     Minutes per session: None    Stress: None   Relationships    Social connections:     Talks on phone: None     Gets together: None     Attends Yazidism service: None     Active member of club or organization: None     Attends meetings of clubs or organizations: None     Relationship status: None    Intimate partner violence:     Fear of current or ex partner: None     Emotionally abused: None     Physically abused: None     Forced sexual activity: None   Other Topics Concern    None   Social History Narrative    Denied: History of drug use - As per [employer]      Current Medications:     Current Outpatient Medications   Medication Sig Dispense Refill    atorvastatin (LIPITOR) 20 mg tablet Take 1 tablet (20 mg total) by mouth daily 90 tablet 1    cetirizine (ZyrTEC) 10 mg tablet Take 10 mg by mouth      diphenhydrAMINE-APAP  MG TABS Take 1 tablet by mouth      irbesartan (AVAPRO) 150 mg tablet Take 1 tablet (150 mg total) by mouth daily at bedtime 90 tablet 1    methocarbamol (ROBAXIN) 750 mg tablet Take 1 tablet (750 mg total) by mouth daily at bedtime as needed for muscle spasms 30 tablet 0    VENTOLIN  (90 Base) MCG/ACT inhaler Inhale 2 puffs every 4 (four) hours as needed for shortness of breath 1 Inhaler 2    montelukast (SINGULAIR) 10 mg tablet Take 1 tablet (10 mg total) by mouth daily at bedtime 90 tablet 0    sildenafil (VIAGRA) 100 mg tablet Take 1 tablet (100 mg total) by mouth daily as needed for erectile dysfunction 30 tablet 0     No current facility-administered medications for this visit  Allergies: Allergies   Allergen Reactions    No Active Allergies       Physical Exam:     /72   Pulse 82   Temp (!) 97 2 °F (36 2 °C)   Ht 6' 3" (1 905 m)   Wt 104 kg (229 lb 12 8 oz)   SpO2 98%   BMI 28 72 kg/m²     Physical Exam   Constitutional: He is oriented to person, place, and time  He appears well-developed and well-nourished     HENT:   Head: Normocephalic and atraumatic  Right Ear: Tympanic membrane, external ear and ear canal normal    Left Ear: Tympanic membrane, external ear and ear canal normal    Nose: Nose normal    Mouth/Throat: Oropharynx is clear and moist and mucous membranes are normal  No oropharyngeal exudate, posterior oropharyngeal edema or posterior oropharyngeal erythema  Eyes: Pupils are equal, round, and reactive to light  EOM are normal    Neck: Normal range of motion  Neck supple  Cardiovascular: Normal rate, regular rhythm and normal heart sounds  Exam reveals no gallop and no friction rub  No murmur heard  Pulmonary/Chest: Effort normal and breath sounds normal  No respiratory distress  He has no wheezes  He has no rales  Musculoskeletal: Normal range of motion  Lymphadenopathy:     He has no cervical adenopathy  Neurological: He is alert and oriented to person, place, and time  Skin: Skin is warm and dry  Psychiatric: He has a normal mood and affect  His behavior is normal  Judgment and thought content normal    Vitals reviewed      Anum Busch PA-C  [id number] [facility]

## 2020-01-28 NOTE — PATIENT INSTRUCTIONS

## 2020-02-25 ENCOUNTER — OFFICE VISIT (OUTPATIENT)
Dept: FAMILY MEDICINE CLINIC | Facility: CLINIC | Age: 55
End: 2020-02-25
Payer: COMMERCIAL

## 2020-02-25 VITALS
SYSTOLIC BLOOD PRESSURE: 152 MMHG | DIASTOLIC BLOOD PRESSURE: 80 MMHG | WEIGHT: 229.8 LBS | HEART RATE: 68 BPM | HEIGHT: 75 IN | BODY MASS INDEX: 28.57 KG/M2 | TEMPERATURE: 98.3 F | OXYGEN SATURATION: 98 %

## 2020-02-25 DIAGNOSIS — J40 BRONCHITIS: Primary | ICD-10-CM

## 2020-02-25 PROCEDURE — 3008F BODY MASS INDEX DOCD: CPT | Performed by: PHYSICIAN ASSISTANT

## 2020-02-25 PROCEDURE — 1036F TOBACCO NON-USER: CPT | Performed by: PHYSICIAN ASSISTANT

## 2020-02-25 PROCEDURE — 99214 OFFICE O/P EST MOD 30 MIN: CPT | Performed by: PHYSICIAN ASSISTANT

## 2020-02-25 PROCEDURE — 3079F DIAST BP 80-89 MM HG: CPT | Performed by: PHYSICIAN ASSISTANT

## 2020-02-25 PROCEDURE — 3077F SYST BP >= 140 MM HG: CPT | Performed by: PHYSICIAN ASSISTANT

## 2020-02-25 RX ORDER — AZITHROMYCIN 250 MG/1
TABLET, FILM COATED ORAL
Qty: 6 TABLET | Refills: 0 | Status: SHIPPED | OUTPATIENT
Start: 2020-02-25 | End: 2020-02-29

## 2020-02-25 NOTE — LETTER
February 25, 2020     Patient: Kimani Wright   YOB: 1965   Date of Visit: 2/25/2020       To Whom it May Concern:    Rod Hollis is under my professional care  He was seen in my office on 2/25/2020  He may return to work on 2/26/2020  Please excuse from work 2/24/2020 also  If you have any questions or concerns, please don't hesitate to call           Sincerely,          Jessica Rodgers PA-C        CC: No Recipients

## 2020-02-25 NOTE — PROGRESS NOTES
Assessment/Plan:    Problem List Items Addressed This Visit     None      Visit Diagnoses     Bronchitis    -  Primary    Relevant Medications    azithromycin (Zithromax) 250 mg tablet    BMI 28 0-28 9,adult               Diagnoses and all orders for this visit:    Bronchitis  -     azithromycin (Zithromax) 250 mg tablet; Day 1 take 2 tablets, days 2-5 take 1 tablet  BMI 28 0-28 9,adult              Subjective:      Patient ID: Haritha House is a 62 y o  male  Fernando Garcia is here today complaining of sore throat since Friday (2/22/2020)  Since that time, has developed a dry cough  He has a history of pneumonia  Denies fever/chills  He lives with his girlfriend in a small house, they have a dog and 3 cats, he is allergic to dogs and cats  He started taking Singulair recently and does admit to a big improvement in allergy symptoms  The following portions of the patient's history were reviewed and updated as appropriate:   He has a past medical history of Bilateral leg weakness, Fractures, Hypertension, and Pneumonia ,  does not have any pertinent problems on file  ,   has a past surgical history that includes Knee surgery; Knee arthroscopy (Bilateral); Hernia repair (Bilateral); Back surgery; and pr exc skin benig >4 cm trunk,arm,leg (N/A, 2/13/2019)  ,  family history includes Hypertension in his father; No Known Problems in his family and mother  ,   reports that he has quit smoking  He has quit using smokeless tobacco  He reports that he drank alcohol  He reports that he has current or past drug history  Drug: Marijuana  ,  is allergic to no active allergies     Current Outpatient Medications   Medication Sig Dispense Refill    atorvastatin (LIPITOR) 20 mg tablet Take 1 tablet (20 mg total) by mouth daily 90 tablet 1    irbesartan (AVAPRO) 150 mg tablet Take 1 tablet (150 mg total) by mouth daily at bedtime 90 tablet 1    methocarbamol (ROBAXIN) 750 mg tablet Take 1 tablet (750 mg total) by mouth daily at bedtime as needed for muscle spasms 30 tablet 0    montelukast (SINGULAIR) 10 mg tablet Take 1 tablet (10 mg total) by mouth daily at bedtime 90 tablet 0    VENTOLIN  (90 Base) MCG/ACT inhaler Inhale 2 puffs every 4 (four) hours as needed for shortness of breath 1 Inhaler 2    azithromycin (Zithromax) 250 mg tablet Day 1 take 2 tablets, days 2-5 take 1 tablet  6 tablet 0    cetirizine (ZyrTEC) 10 mg tablet Take 10 mg by mouth      diphenhydrAMINE-APAP  MG TABS Take 1 tablet by mouth      sildenafil (VIAGRA) 100 mg tablet Take 1 tablet (100 mg total) by mouth daily as needed for erectile dysfunction 30 tablet 0     No current facility-administered medications for this visit  Review of Systems   Constitutional: Negative for activity change, appetite change, chills, diaphoresis, fatigue, fever and unexpected weight change  HENT: Positive for sore throat  Negative for congestion, ear pain, postnasal drip, rhinorrhea, sinus pressure, sinus pain, sneezing, tinnitus and voice change  Eyes: Negative for pain, redness and visual disturbance  Respiratory: Positive for cough (non-productive)  Negative for chest tightness, shortness of breath and wheezing  Cardiovascular: Negative for chest pain, palpitations and leg swelling  Gastrointestinal: Negative for abdominal pain, blood in stool, constipation, diarrhea, nausea and vomiting  Genitourinary: Negative for difficulty urinating, dysuria, frequency, hematuria and urgency  Musculoskeletal: Negative for arthralgias, back pain, gait problem, joint swelling, myalgias, neck pain and neck stiffness  Skin: Negative for color change, pallor, rash and wound  Neurological: Negative for dizziness, tremors, weakness, light-headedness and headaches  Psychiatric/Behavioral: Negative for dysphoric mood, self-injury, sleep disturbance and suicidal ideas  The patient is not nervous/anxious            Objective:  Vitals:    02/25/20 1456   BP: 152/80   Pulse: 68   Temp: 98 3 °F (36 8 °C)   SpO2: 98%   Weight: 104 kg (229 lb 12 8 oz)   Height: 6' 3" (1 905 m)     Body mass index is 28 72 kg/m²  Physical Exam   Constitutional: He is oriented to person, place, and time  He appears well-developed and well-nourished  No distress  HENT:   Head: Normocephalic and atraumatic  Right Ear: Hearing, tympanic membrane, external ear and ear canal normal    Left Ear: Hearing, tympanic membrane, external ear and ear canal normal    Nose: Nose normal    Mouth/Throat: Uvula is midline, oropharynx is clear and moist and mucous membranes are normal  No oropharyngeal exudate  +PND present   Eyes: Conjunctivae are normal  Right eye exhibits no discharge  Left eye exhibits no discharge  No scleral icterus  Neck: Neck supple  Carotid bruit is not present  No thyromegaly present  Cardiovascular: Normal rate, regular rhythm and normal heart sounds  No murmur heard  Pulmonary/Chest: Effort normal and breath sounds normal  No respiratory distress  He has no wheezes  +coarse breath sounds   Abdominal: Soft  Bowel sounds are normal  He exhibits no distension and no mass  There is no tenderness  There is no rebound and no guarding  Musculoskeletal: Normal range of motion  He exhibits no edema or tenderness  Lymphadenopathy:     He has no cervical adenopathy  Neurological: He is alert and oriented to person, place, and time  Skin: Skin is warm and dry  No rash noted  He is not diaphoretic  No erythema  Psychiatric: He has a normal mood and affect  His behavior is normal  Judgment and thought content normal    Vitals reviewed  BMI Counseling: Body mass index is 28 72 kg/m²  The BMI is above normal  Nutrition recommendations include reducing portion sizes, decreasing overall calorie intake and 3-5 servings of fruits/vegetables daily

## 2020-03-02 ENCOUNTER — OFFICE VISIT (OUTPATIENT)
Dept: FAMILY MEDICINE CLINIC | Facility: CLINIC | Age: 55
End: 2020-03-02
Payer: COMMERCIAL

## 2020-03-02 VITALS
BODY MASS INDEX: 28.5 KG/M2 | HEIGHT: 75 IN | HEART RATE: 74 BPM | OXYGEN SATURATION: 97 % | SYSTOLIC BLOOD PRESSURE: 136 MMHG | TEMPERATURE: 97.8 F | WEIGHT: 229.2 LBS | DIASTOLIC BLOOD PRESSURE: 78 MMHG

## 2020-03-02 DIAGNOSIS — J40 BRONCHITIS: Primary | ICD-10-CM

## 2020-03-02 PROCEDURE — 3075F SYST BP GE 130 - 139MM HG: CPT | Performed by: PHYSICIAN ASSISTANT

## 2020-03-02 PROCEDURE — 1036F TOBACCO NON-USER: CPT | Performed by: PHYSICIAN ASSISTANT

## 2020-03-02 PROCEDURE — 3078F DIAST BP <80 MM HG: CPT | Performed by: PHYSICIAN ASSISTANT

## 2020-03-02 PROCEDURE — 99213 OFFICE O/P EST LOW 20 MIN: CPT | Performed by: PHYSICIAN ASSISTANT

## 2020-03-02 PROCEDURE — 3008F BODY MASS INDEX DOCD: CPT | Performed by: PHYSICIAN ASSISTANT

## 2020-03-02 NOTE — PROGRESS NOTES
Assessment/Plan:    Problem List Items Addressed This Visit     None      Visit Diagnoses     Bronchitis    -  Primary           Diagnoses and all orders for this visit:    Bronchitis        I reassured Agus Patterson that he is doing much better  He will continue Mucinex as needed for symptomatic relief  Push fluids  He will continue to use albuterol as needed for wheezing  He will let us know if symptoms do not improve or worsen  Subjective:      Patient ID: Jesi Obando is a 62 y o  male  Agus Patterson is a pleasant 62year old male who is here today because he wanted to make sure he was improving  He was diagnosed with bronchitis on 2/25 and started on zithromax  He admits that over the weekend he has started feeling a lot better, but kept his appointment just in case  He has been using his inhaler slightly more that normal and has been slightly more short of breath  He feels like he is progressing each day  He denies any fever, chills, chest pain, congestion, ear pain, nausea, vomiting, or abdominal pain  He has been taking Mucinex, which has also been helping  The following portions of the patient's history were reviewed and updated as appropriate:   He has a past medical history of Bilateral leg weakness, Fractures, Hypertension, and Pneumonia ,  does not have any pertinent problems on file  ,   has a past surgical history that includes Knee surgery; Knee arthroscopy (Bilateral); Hernia repair (Bilateral); Back surgery; and pr exc skin benig >4 cm trunk,arm,leg (N/A, 2/13/2019)  ,  family history includes Hypertension in his father; No Known Problems in his family and mother  ,   reports that he has quit smoking  He has quit using smokeless tobacco  He reports that he drank alcohol  He reports that he has current or past drug history  Drug: Marijuana  ,  is allergic to no active allergies     Current Outpatient Medications   Medication Sig Dispense Refill    atorvastatin (LIPITOR) 20 mg tablet Take 1 tablet (20 mg total) by mouth daily 90 tablet 1    cetirizine (ZyrTEC) 10 mg tablet Take 10 mg by mouth      diphenhydrAMINE-APAP  MG TABS Take 1 tablet by mouth      irbesartan (AVAPRO) 150 mg tablet Take 1 tablet (150 mg total) by mouth daily at bedtime 90 tablet 1    methocarbamol (ROBAXIN) 750 mg tablet Take 1 tablet (750 mg total) by mouth daily at bedtime as needed for muscle spasms 30 tablet 0    montelukast (SINGULAIR) 10 mg tablet Take 1 tablet (10 mg total) by mouth daily at bedtime 90 tablet 0    sildenafil (VIAGRA) 100 mg tablet Take 1 tablet (100 mg total) by mouth daily as needed for erectile dysfunction 30 tablet 0    VENTOLIN  (90 Base) MCG/ACT inhaler Inhale 2 puffs every 4 (four) hours as needed for shortness of breath 1 Inhaler 2     No current facility-administered medications for this visit  Review of Systems   Constitutional: Negative for chills, diaphoresis, fatigue and fever  HENT: Negative for congestion, ear pain, postnasal drip, rhinorrhea, sneezing, sore throat and trouble swallowing  Eyes: Negative for pain and visual disturbance  Respiratory: Positive for cough, shortness of breath and wheezing  Negative for apnea  Cardiovascular: Negative for chest pain and palpitations  Gastrointestinal: Negative for abdominal pain, constipation, diarrhea, nausea and vomiting  Genitourinary: Negative for dysuria  Musculoskeletal: Negative for arthralgias, gait problem and myalgias  Neurological: Negative for dizziness, syncope, weakness, light-headedness, numbness and headaches  Psychiatric/Behavioral: Negative for suicidal ideas  The patient is not nervous/anxious  Objective:  Vitals:    03/02/20 0816   BP: 136/78   Pulse: 74   Temp: 97 8 °F (36 6 °C)   SpO2: 97%   Weight: 104 kg (229 lb 3 2 oz)   Height: 6' 3" (1 905 m)     Body mass index is 28 65 kg/m²  Physical Exam   Constitutional: He is oriented to person, place, and time   He appears well-developed and well-nourished  HENT:   Head: Normocephalic and atraumatic  Right Ear: Tympanic membrane, external ear and ear canal normal    Left Ear: Tympanic membrane, external ear and ear canal normal    Nose: Nose normal    Mouth/Throat: Oropharynx is clear and moist and mucous membranes are normal  No oropharyngeal exudate, posterior oropharyngeal edema or posterior oropharyngeal erythema  Eyes: Pupils are equal, round, and reactive to light  EOM are normal    Neck: Normal range of motion  Neck supple  Cardiovascular: Normal rate, regular rhythm and normal heart sounds  Exam reveals no gallop and no friction rub  No murmur heard  Pulmonary/Chest: Effort normal and breath sounds normal  No respiratory distress  He has no wheezes  He has no rales  Abdominal: Soft  Bowel sounds are normal  There is no tenderness  There is no rebound and no guarding  Musculoskeletal: Normal range of motion  Lymphadenopathy:     He has no cervical adenopathy  Neurological: He is alert and oriented to person, place, and time  Skin: Skin is warm and dry  Psychiatric: He has a normal mood and affect  His behavior is normal  Judgment and thought content normal    Vitals reviewed

## 2020-03-05 ENCOUNTER — TELEPHONE (OUTPATIENT)
Dept: OBGYN CLINIC | Facility: HOSPITAL | Age: 55
End: 2020-03-05

## 2020-03-05 DIAGNOSIS — M17.11 PRIMARY OSTEOARTHRITIS OF RIGHT KNEE: Primary | ICD-10-CM

## 2020-03-11 NOTE — TELEPHONE ENCOUNTER
Request submitted to [facility] for Synvisc One  Status of order can be checked with Stephanie Alston 87 at [phone]  Called patient and left a message for him informing him that we called this script into pharmacy on 3/5/20 and once they review benefits they will reach out to him from  [phone] number for consent to ship injections  If he has any questions he can call them at above number

## 2020-04-06 DIAGNOSIS — Z91.09 ENVIRONMENTAL ALLERGIES: ICD-10-CM

## 2020-04-06 RX ORDER — MONTELUKAST SODIUM 10 MG/1
10 TABLET ORAL
Qty: 90 TABLET | Refills: 0 | Status: SHIPPED | OUTPATIENT
Start: 2020-04-06 | End: 2020-08-24

## 2020-04-09 DIAGNOSIS — J45.20 MILD INTERMITTENT REACTIVE AIRWAY DISEASE WITHOUT COMPLICATION: ICD-10-CM

## 2020-05-13 DIAGNOSIS — I10 ESSENTIAL HYPERTENSION: ICD-10-CM

## 2020-05-13 RX ORDER — IRBESARTAN 150 MG/1
150 TABLET ORAL
Qty: 90 TABLET | Refills: 1 | Status: SHIPPED | OUTPATIENT
Start: 2020-05-13 | End: 2020-08-24

## 2020-05-14 DIAGNOSIS — G89.29 CHRONIC BILATERAL LOW BACK PAIN WITHOUT SCIATICA: ICD-10-CM

## 2020-05-14 DIAGNOSIS — M54.50 CHRONIC BILATERAL LOW BACK PAIN WITHOUT SCIATICA: ICD-10-CM

## 2020-05-14 RX ORDER — METHOCARBAMOL 750 MG/1
750 TABLET, FILM COATED ORAL
Qty: 30 TABLET | Refills: 0 | Status: SHIPPED | OUTPATIENT
Start: 2020-05-14 | End: 2020-07-06 | Stop reason: SDUPTHER

## 2020-05-15 DIAGNOSIS — N52.9 ERECTILE DYSFUNCTION, UNSPECIFIED ERECTILE DYSFUNCTION TYPE: ICD-10-CM

## 2020-05-15 RX ORDER — SILDENAFIL 100 MG/1
100 TABLET, FILM COATED ORAL DAILY PRN
Qty: 30 TABLET | Refills: 0 | Status: SHIPPED | OUTPATIENT
Start: 2020-05-15 | End: 2020-08-27 | Stop reason: SDUPTHER

## 2020-07-06 DIAGNOSIS — G89.29 CHRONIC BILATERAL LOW BACK PAIN WITHOUT SCIATICA: ICD-10-CM

## 2020-07-06 DIAGNOSIS — M54.50 CHRONIC BILATERAL LOW BACK PAIN WITHOUT SCIATICA: ICD-10-CM

## 2020-07-06 RX ORDER — METHOCARBAMOL 750 MG/1
750 TABLET, FILM COATED ORAL
Qty: 30 TABLET | Refills: 0 | Status: SHIPPED | OUTPATIENT
Start: 2020-07-06 | End: 2020-08-24

## 2020-07-13 NOTE — TELEPHONE ENCOUNTER
Called patient and left voice mail advising her that she or the patient will need to call Lizzy Rangel at [phone] to give consent in order to ship the injections to our office  Message similar to this was left on patient's voicemail by [facility] 4 months ago

## 2020-07-13 NOTE — TELEPHONE ENCOUNTER
Called patient PAULA joseign her know that the order was only for the right knee we can ask the provider for an order for the left knee if that what they wish  Asked her to call us back with their wishes

## 2020-07-13 NOTE — TELEPHONE ENCOUNTER
Patients spouse is calling back in stating that she contacted the insurance company who advised her that the injections are only for the right knee in which he would normally get them done for both  She is asking for a call back relating this as soon as possible      Call back# [phone]

## 2020-07-14 DIAGNOSIS — M17.12 PRIMARY OSTEOARTHRITIS OF LEFT KNEE: ICD-10-CM

## 2020-07-14 DIAGNOSIS — M17.11 PRIMARY OSTEOARTHRITIS OF RIGHT KNEE: Primary | ICD-10-CM

## 2020-07-15 ENCOUNTER — TELEPHONE (OUTPATIENT)
Dept: OBGYN CLINIC | Facility: MEDICAL CENTER | Age: 55
End: 2020-07-15

## 2020-07-15 NOTE — TELEPHONE ENCOUNTER
Called [facility] and changed script from right knee to bilateral knees  Script needs to get processed through medical billing  Will call patient if co-pay changes, then will call us to set up shipment for patient       Called [facility] for patient

## 2020-07-15 NOTE — TELEPHONE ENCOUNTER
Patient sees Dr Yulisa Plasencia  Patient returned phone call and gave him MA message about the [facility]

## 2020-07-27 ENCOUNTER — TELEPHONE (OUTPATIENT)
Dept: OBGYN CLINIC | Facility: HOSPITAL | Age: 55
End: 2020-07-27

## 2020-07-27 NOTE — TELEPHONE ENCOUNTER
TO BE COMPLETED BY :     Office location appointment scheduled: [facility]    Date of First Appointment scheduled: 7/29/20    Additional Comments:

## 2020-07-27 NOTE — TELEPHONE ENCOUNTER
Plan called Randee Guthrie is not preferred  Switched to [facility] and does not require auth   B&B    TO BE COMPLETED BY CENTRAL AUTH TEAM:     Physician: DR Ramona Demarco    Medication: Tom Li     Number of Injections in Series (Appointments scheduled 1 week apart from one another): 1    Schedule after this date: UPON AVAILABILITY     Billing Info: Buy and Bill/Specialty Pharmacy-Patient Supply<<BUY AND BILL     Appointment Message Line: BILATERAL [address]   (please copy and paste appointment message line when scheduling appointment)

## 2020-07-29 ENCOUNTER — OFFICE VISIT (OUTPATIENT)
Dept: OBGYN CLINIC | Facility: CLINIC | Age: 55
End: 2020-07-29
Payer: COMMERCIAL

## 2020-07-29 VITALS
HEIGHT: 75 IN | DIASTOLIC BLOOD PRESSURE: 93 MMHG | TEMPERATURE: 98.9 F | SYSTOLIC BLOOD PRESSURE: 135 MMHG | WEIGHT: 224 LBS | BODY MASS INDEX: 27.85 KG/M2 | HEART RATE: 83 BPM

## 2020-07-29 DIAGNOSIS — M17.11 PRIMARY OSTEOARTHRITIS OF RIGHT KNEE: ICD-10-CM

## 2020-07-29 DIAGNOSIS — M17.12 PRIMARY OSTEOARTHRITIS OF LEFT KNEE: Primary | ICD-10-CM

## 2020-07-29 PROCEDURE — 20610 DRAIN/INJ JOINT/BURSA W/O US: CPT | Performed by: ORTHOPAEDIC SURGERY

## 2020-07-29 PROCEDURE — 1036F TOBACCO NON-USER: CPT | Performed by: ORTHOPAEDIC SURGERY

## 2020-07-29 PROCEDURE — 3080F DIAST BP >= 90 MM HG: CPT | Performed by: ORTHOPAEDIC SURGERY

## 2020-07-29 PROCEDURE — 3008F BODY MASS INDEX DOCD: CPT | Performed by: ORTHOPAEDIC SURGERY

## 2020-07-29 PROCEDURE — 3075F SYST BP GE 130 - 139MM HG: CPT | Performed by: ORTHOPAEDIC SURGERY

## 2020-07-29 PROCEDURE — 3008F BODY MASS INDEX DOCD: CPT | Performed by: PHYSICIAN ASSISTANT

## 2020-07-29 NOTE — PROGRESS NOTES
Chief Complaint  Bilateral knee pain    History Of Presenting Illness  Huy Varghese 1965 presents with bilateral knee pain due to osteoarthritis  Patient presents for bilateral durolane injections      Current Medications  Current Outpatient Medications   Medication Sig Dispense Refill    atorvastatin (LIPITOR) 20 mg tablet Take 1 tablet (20 mg total) by mouth daily 90 tablet 1    cetirizine (ZyrTEC) 10 mg tablet Take 10 mg by mouth      diphenhydrAMINE-APAP  MG TABS Take 1 tablet by mouth      irbesartan (AVAPRO) 150 mg tablet Take 1 tablet (150 mg total) by mouth daily at bedtime 90 tablet 1    methocarbamol (ROBAXIN) 750 mg tablet Take 1 tablet (750 mg total) by mouth daily at bedtime as needed for muscle spasms 30 tablet 0    montelukast (SINGULAIR) 10 mg tablet Take 1 tablet (10 mg total) by mouth daily at bedtime 90 tablet 0    sildenafil (VIAGRA) 100 mg tablet Take 1 tablet (100 mg total) by mouth daily as needed for erectile dysfunction 30 tablet 0    VENTOLIN  (90 Base) MCG/ACT inhaler Inhale 2 puffs every 4 (four) hours as needed for shortness of breath 3 Inhaler 1     No current facility-administered medications for this visit          Current Problems    Active Problems:   Patient Active Problem List    Diagnosis Date Noted    Essential hypertension 09/25/2019    Screening for colon cancer 04/18/2019    Mixed hyperlipidemia 04/16/2019    Osteoarthritis of knee 03/22/2019    Knee pain 80/97/0125    Umbilical hernia without obstruction and without gangrene 61/01/2494    Umbilical hernia 49/46/4365    Shortness of breath     Lower back pain 11/13/2012         Review of Systems:    General: negative for - chills, fatigue, fever,  weight gain or weight loss  Psychological: negative for - anxiety, behavioral disorder, concentration difficulties  Ophthalmic: negative for - blurry vision, decreased vision, double vision,      Past Medical History:   Past Medical History: Diagnosis Date    Bilateral leg weakness     Fractures     Hypertension     Pneumonia        Past Surgical History:   Past Surgical History:   Procedure Laterality Date    BACK SURGERY      thoracic    HERNIA REPAIR Bilateral     inguinal    KNEE ARTHROSCOPY Bilateral     KNEE SURGERY      DE EXC SKIN BENIG >4 CM TRUNK,ARM,LEG N/A 2/13/2019    Procedure: EXCISION LIPOMA TORSO;  Surgeon: Jaki Cedillo DO;  Location: [facility];  Service: General       Family History:  Family history reviewed and non-contributory  Family History   Problem Relation Age of Onset    No Known Problems Mother     Hypertension Father     No Known Problems Family        Social History:  Social History     Socioeconomic History    Marital status:      Spouse name: None    Number of children: None    Years of education: None    Highest education level: None   Occupational History    Occupation:    Social Needs    Financial resource strain: None    Food insecurity:     Worry: None     Inability: None    Transportation needs:     Medical: None     Non-medical: None   Tobacco Use    Smoking status: Former Smoker    Smokeless tobacco: Former User    Tobacco comment: quit 15 yrs ago   Substance and Sexual Activity    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    Drug use: Not Currently     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    Sexual activity: Yes     Comment: Denied: History of sexually active with persons at risk for HIV-related disease - As per [employer]    Lifestyle    Physical activity:     Days per week: None     Minutes per session: None    Stress: None   Relationships    Social connections:     Talks on phone: None     Gets together: None     Attends [employer] service: None     Active member of club or organization: None     Attends meetings of clubs or organizations: None     Relationship status: None    Intimate partner violence:     Fear of current or ex partner: None Emotionally abused: None     Physically abused: None     Forced sexual activity: None   Other Topics Concern    None   Social History Narrative    Denied: History of drug use - As per [employer]       Allergies:    Allergies   Allergen Reactions    No Active Allergies            Physical ExaminationBP 135/93 (BP Location: Right arm, Patient Position: Sitting, Cuff Size: Standard)   Pulse 83   Temp 98 9 °F (37 2 °C)   Ht 6' 3" (1 905 m)   Wt 102 kg (224 lb)   BMI 28 00 kg/m²   Gen: Alert and oriented to person, place, time  HEENT: EOMI, eyes clear, moist mucus membranes, hearing intact      Orthopedic Exam  Both knees examination unchanged 0-130 degree flexion with the mild joint line tenderness and crepitus          Impression  Bilateral knee osteoarthritis            Plan    Both the knees injected with hyaluronic acid  Follow-up in 6 months for repeat injection  X-ray both knees on arrival next visit  Large joint arthrocentesis: L knee  Date/Time: 7/29/2020 2:53 PM  Consent given by: patient  Site marked: site marked  Timeout: Immediately prior to procedure a time out was called to verify the correct patient, procedure, equipment, support staff and site/side marked as required   Supporting Documentation  Indications: pain and joint swelling   Procedure Details  Location: knee - L knee  Preparation: Patient was prepped and draped in the usual sterile fashion  Needle size: 22 G  Ultrasound guidance: no  Approach: anterolateral  Medications administered: 3 mL sodium hyaluronate 60 MG/3ML    Patient tolerance: patient tolerated the procedure well with no immediate complications  Dressing:  Sterile dressing applied    Large joint arthrocentesis: R knee  Date/Time: 7/29/2020 2:54 PM  Consent given by: patient  Site marked: site marked  Timeout: Immediately prior to procedure a time out was called to verify the correct patient, procedure, equipment, support staff and site/side marked as required   Supporting Documentation  Indications: pain, joint swelling and diagnostic evaluation   Procedure Details  Location: knee - R knee  Preparation: Patient was prepped and draped in the usual sterile fashion  Needle size: 18 G  Ultrasound guidance: no  Approach: anterolateral  Medications administered: 3 mL sodium hyaluronate 60 MG/3ML    Aspirate amount: 50 mL  Aspirate: clear    Patient tolerance: patient tolerated the procedure well with no immediate complications  Dressing:  Sterile dressing applied        Mehreen Anderson MD        Portions of the record may have been created with voice recognition software  Occasional wrong word or "sound a like" substitutions may have occurred due to the inherent limitations of voice recognition software  Read the chart carefully and recognize, using context, where substitutions have occurred

## 2020-08-21 DIAGNOSIS — G89.29 CHRONIC BILATERAL LOW BACK PAIN WITHOUT SCIATICA: ICD-10-CM

## 2020-08-21 DIAGNOSIS — M54.50 CHRONIC BILATERAL LOW BACK PAIN WITHOUT SCIATICA: ICD-10-CM

## 2020-08-21 DIAGNOSIS — Z91.09 ENVIRONMENTAL ALLERGIES: ICD-10-CM

## 2020-08-21 DIAGNOSIS — I10 ESSENTIAL HYPERTENSION: ICD-10-CM

## 2020-08-21 DIAGNOSIS — E78.2 MIXED HYPERLIPIDEMIA: ICD-10-CM

## 2020-08-24 RX ORDER — METHOCARBAMOL 750 MG/1
TABLET, FILM COATED ORAL
Qty: 30 TABLET | Refills: 0 | Status: SHIPPED | OUTPATIENT
Start: 2020-08-24 | End: 2020-09-21

## 2020-08-24 RX ORDER — MONTELUKAST SODIUM 10 MG/1
TABLET ORAL
Qty: 90 TABLET | Refills: 3 | Status: SHIPPED | OUTPATIENT
Start: 2020-08-24 | End: 2021-05-30 | Stop reason: SDUPTHER

## 2020-08-24 RX ORDER — ATORVASTATIN CALCIUM 20 MG/1
TABLET, FILM COATED ORAL
Qty: 90 TABLET | Refills: 3 | Status: SHIPPED | OUTPATIENT
Start: 2020-08-24 | End: 2021-06-21

## 2020-08-24 RX ORDER — IRBESARTAN 150 MG/1
TABLET ORAL
Qty: 90 TABLET | Refills: 3 | Status: SHIPPED | OUTPATIENT
Start: 2020-08-24 | End: 2021-08-11

## 2020-08-27 DIAGNOSIS — N52.9 ERECTILE DYSFUNCTION, UNSPECIFIED ERECTILE DYSFUNCTION TYPE: ICD-10-CM

## 2020-08-27 RX ORDER — SILDENAFIL 100 MG/1
100 TABLET, FILM COATED ORAL DAILY PRN
Qty: 30 TABLET | Refills: 0 | Status: SHIPPED | OUTPATIENT
Start: 2020-08-27 | End: 2020-11-10

## 2020-08-31 ENCOUNTER — TELEMEDICINE (OUTPATIENT)
Dept: FAMILY MEDICINE CLINIC | Facility: CLINIC | Age: 55
End: 2020-08-31
Payer: COMMERCIAL

## 2020-08-31 VITALS — DIASTOLIC BLOOD PRESSURE: 74 MMHG | SYSTOLIC BLOOD PRESSURE: 115 MMHG | TEMPERATURE: 98 F | HEART RATE: 71 BPM

## 2020-08-31 DIAGNOSIS — Z20.828 EXPOSURE TO SARS-ASSOCIATED CORONAVIRUS: Primary | ICD-10-CM

## 2020-08-31 DIAGNOSIS — J22 LOWER RESPIRATORY TRACT INFECTION: ICD-10-CM

## 2020-08-31 PROCEDURE — U0003 INFECTIOUS AGENT DETECTION BY NUCLEIC ACID (DNA OR RNA); SEVERE ACUTE RESPIRATORY SYNDROME CORONAVIRUS 2 (SARS-COV-2) (CORONAVIRUS DISEASE [COVID-19]), AMPLIFIED PROBE TECHNIQUE, MAKING USE OF HIGH THROUGHPUT TECHNOLOGIES AS DESCRIBED BY CMS-2020-01-R: HCPCS | Performed by: PHYSICIAN ASSISTANT

## 2020-08-31 PROCEDURE — 99214 OFFICE O/P EST MOD 30 MIN: CPT | Performed by: PHYSICIAN ASSISTANT

## 2020-08-31 RX ORDER — AZITHROMYCIN 250 MG/1
TABLET, FILM COATED ORAL
Qty: 6 TABLET | Refills: 0 | Status: SHIPPED | OUTPATIENT
Start: 2020-08-31 | End: 2020-09-04

## 2020-08-31 NOTE — PROGRESS NOTES
COVID-19 Virtual Visit     Assessment/Plan:    Problem List Items Addressed This Visit     None      Visit Diagnoses     Exposure to SARS-associated coronavirus    -  Primary    Relevant Orders    Novel Coronavirus (COVID-19), PCR [facility] - Collected in Office        This virtual check-in was done via [employer] and patient was informed that this is not a secure, HIPAA-complaint platform  He agrees to proceed       Disposition:      I recommended Anson Long come to our office for a nasal swab for COVID-19    I advised him to self quarantine and continue symptomatic treatment  I am also going to start him on zithromax to cover for a bacterial lower respiratory infection  I advised him to let us know if symptoms do not improve or worsen  I spent 15 minutes directly with the patient during this visit    Encounter provider Dionne Don PA-C    Provider located at [address] [address]    Recent Visits  No visits were found meeting these conditions  Showing recent visits within past 7 days and meeting all other requirements     Today's Visits  Date Type Provider Dept   08/31/20 Telemedicine Dionne Don PA-C  [facility]   Showing today's visits and meeting all other requirements     Future Appointments  No visits were found meeting these conditions  Showing future appointments within next 150 days and meeting all other requirements        Patient agrees to participate in a virtual check in via telephone or video visit instead of presenting to the office to address urgent/immediate medical needs  Patient is aware this is a billable service  After connecting through [employer], the patient was identified by name and date of birth  Connor Viramontes was informed that this was a telemedicine visit and that the exam was being conducted confidentially over secure lines  My office door was closed  No one else was in the room    Connor Viramontes acknowledged consent and understanding of privacy and security of the telemedicine visit  I informed the patient that I have reviewed his record in Epic and presented the opportunity for him to ask any questions regarding the visit today  The patient agreed to participate  Subjective  Maria Guadalupe Matos is a 60 y o  male who is concerned about COVID-19  He reports cough, shortness of breath, sore throat, fatigue and nasal congestion, sinus pressure  He has not experienced fever, chills, repeated shaking with chills, headache, anorexia, myalgias, anosmia, abdominal pain, diarrhea, nausea and vomiting He has had contact with a person who is under investigation for or who is positive for COVID-19 within the last 14 days  He has not been hospitalized recently for fever and/or lower respiratory symptoms  Sabino Riggs is a pleasant 60year old male who is here today complaining of cold symptoms since last Thursday  He started with a sore throat and feeling flushed on Thursday night  Friday he began with runny nose, sinus pressure, congestion, and a cough  On Saturday the cough became more productive and he had some shortness of breath  He also noticed that his back hurt from coughing  He has been taking Mucinex and using Ventolin, which does help improve the chest tightness and shortness of breath  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  He was in contact with a patient who tested positive the Thursday prior to his symptoms starting  He denies any weakness, body aches, nausea, vomiting, diarrhea, abdominal pain, fevers, or chills       Past Medical History:   Diagnosis Date    Bilateral leg weakness     Fractures     Hypertension     Pneumonia        Past Surgical History:   Procedure Laterality Date    BACK SURGERY      thoracic    HERNIA REPAIR Bilateral     inguinal    KNEE ARTHROSCOPY Bilateral     KNEE SURGERY      AR EXC SKIN BENIG >4 CM TRUNK,ARM,LEG N/A 2/13/2019    Procedure: EXCISION LIPOMA TORSO;  Surgeon: Garrett Elizalde DO;  Location: [facility];  Service: General       Current Outpatient Medications   Medication Sig Dispense Refill    atorvastatin (LIPITOR) 20 mg tablet TAKE 1 TABLET BY MOUTH  DAILY 90 tablet 3    irbesartan (AVAPRO) 150 mg tablet TAKE 1 TABLET BY MOUTH  DAILY AT BEDTIME 90 tablet 3    methocarbamol (ROBAXIN) 750 mg tablet TAKE 1 TABLET BY MOUTH  DAILY AT BEDTIME AS NEEDED  FOR MUSCLE SPASMS 30 tablet 0    montelukast (SINGULAIR) 10 mg tablet TAKE 1 TABLET BY MOUTH  DAILY AT BEDTIME 90 tablet 3    VENTOLIN  (90 Base) MCG/ACT inhaler Inhale 2 puffs every 4 (four) hours as needed for shortness of breath 3 Inhaler 1    cetirizine (ZyrTEC) 10 mg tablet Take 10 mg by mouth      diphenhydrAMINE-APAP  MG TABS Take 1 tablet by mouth      sildenafil (VIAGRA) 100 mg tablet Take 1 tablet (100 mg total) by mouth daily as needed for erectile dysfunction 30 tablet 0     No current facility-administered medications for this visit  Allergies   Allergen Reactions    No Active Allergies        Review of Systems   Constitutional: Positive for fatigue  Negative for chills, diaphoresis and fever  HENT: Positive for congestion, postnasal drip, rhinorrhea, sinus pressure and sore throat  Negative for ear pain, sneezing and trouble swallowing  Eyes: Negative for pain and visual disturbance  Respiratory: Positive for cough, chest tightness and shortness of breath  Negative for apnea and wheezing  Cardiovascular: Negative for chest pain and palpitations  Gastrointestinal: Negative for abdominal pain, constipation, diarrhea, nausea and vomiting  Genitourinary: Negative for dysuria and hematuria  Musculoskeletal: Positive for back pain  Negative for arthralgias, gait problem and myalgias  Neurological: Negative for dizziness, syncope, weakness, light-headedness, numbness and headaches  Psychiatric/Behavioral: Negative for suicidal ideas   The patient is not nervous/anxious  Video Exam    Vitals:    08/31/20 1032   BP: 115/74   Pulse: 71   Temp: 98 °F (36 7 °C)         Kelsea Fuller appears alert, no distress, cooperative  Physical Exam  Vitals signs and nursing note reviewed  Constitutional:       General: He is not in acute distress  Appearance: He is well-developed  He is not ill-appearing, toxic-appearing or diaphoretic  HENT:      Head: Normocephalic and atraumatic  Pulmonary:      Effort: Pulmonary effort is normal  No respiratory distress  Comments: Patient is speaking in full, fluent sentences  He does not appear in any acute distress  Neurological:      Mental Status: He is alert and oriented to person, place, and time  Psychiatric:         Behavior: Behavior normal  Behavior is cooperative  Thought Content: Thought content normal          Judgment: Judgment normal           VIRTUAL VISIT DISCLAIMER    Gallo Maher acknowledges that he has consented to an online visit or consultation  He understands that the online visit is based solely on information provided by him, and that, in the absence of a face-to-face physical evaluation by the physician, the diagnosis he receives is both limited and provisional in terms of accuracy and completeness  This is not intended to replace a full medical face-to-face evaluation by the physician  Gallo Maher understands and accepts these terms

## 2020-09-02 LAB — SARS-COV-2 RNA SPEC QL NAA+PROBE: NOT DETECTED

## 2020-09-08 ENCOUNTER — TELEPHONE (OUTPATIENT)
Dept: FAMILY MEDICINE CLINIC | Facility: CLINIC | Age: 55
End: 2020-09-08

## 2020-09-08 NOTE — TELEPHONE ENCOUNTER
----- Message from Aixa Vera sent at 9/8/2020  6:51 AM EDT -----  Regarding: FW: Visit Follow-Up Question  Contact: [phone]    ----- Message -----  From: Nubia Conde  Sent: 9/7/2020   2:54 PM EDT  To: [facility]  Subject: Visit Follow-Up Question                         Trevor Pacheco, I thinking might need another round of antibiotics  Yesterday I started to cough again and woke up this morning coughing like crazy  I have been taking the Mucinex but nut much relief  Thoughts? ??     I will call you after I get to work at 7 Tuesday morning 9/8

## 2020-09-08 NOTE — TELEPHONE ENCOUNTER
Please notify patient of the following: The antibiotic stays in your system for an entire 10 days  I would give it a few more days before taking another  Continue the Mucinex and push fluids  The cough is usually the part that lasts the longest  If he is not feeling better, or starts to feel worse he should let us know

## 2020-09-19 DIAGNOSIS — M54.50 CHRONIC BILATERAL LOW BACK PAIN WITHOUT SCIATICA: ICD-10-CM

## 2020-09-19 DIAGNOSIS — G89.29 CHRONIC BILATERAL LOW BACK PAIN WITHOUT SCIATICA: ICD-10-CM

## 2020-09-21 RX ORDER — METHOCARBAMOL 750 MG/1
TABLET, FILM COATED ORAL
Qty: 30 TABLET | Refills: 0 | Status: SHIPPED | OUTPATIENT
Start: 2020-09-21 | End: 2020-12-15

## 2020-10-16 ENCOUNTER — IMMUNIZATIONS (OUTPATIENT)
Dept: FAMILY MEDICINE CLINIC | Facility: CLINIC | Age: 55
End: 2020-10-16
Payer: COMMERCIAL

## 2020-10-16 DIAGNOSIS — Z23 NEED FOR INFLUENZA VACCINATION: Primary | ICD-10-CM

## 2020-10-16 PROCEDURE — 90682 RIV4 VACC RECOMBINANT DNA IM: CPT | Performed by: PHYSICIAN ASSISTANT

## 2020-10-16 PROCEDURE — 90471 IMMUNIZATION ADMIN: CPT | Performed by: PHYSICIAN ASSISTANT

## 2020-11-09 DIAGNOSIS — N52.9 ERECTILE DYSFUNCTION, UNSPECIFIED ERECTILE DYSFUNCTION TYPE: ICD-10-CM

## 2020-11-10 RX ORDER — SILDENAFIL 100 MG/1
TABLET, FILM COATED ORAL
Qty: 18 TABLET | Refills: 0 | Status: SHIPPED | OUTPATIENT
Start: 2020-11-10 | End: 2021-02-08 | Stop reason: SDUPTHER

## 2020-12-15 DIAGNOSIS — G89.29 CHRONIC BILATERAL LOW BACK PAIN WITHOUT SCIATICA: ICD-10-CM

## 2020-12-15 DIAGNOSIS — M54.50 CHRONIC BILATERAL LOW BACK PAIN WITHOUT SCIATICA: ICD-10-CM

## 2020-12-15 RX ORDER — METHOCARBAMOL 750 MG/1
TABLET, FILM COATED ORAL
Qty: 30 TABLET | Refills: 0 | Status: SHIPPED | OUTPATIENT
Start: 2020-12-15 | End: 2021-01-04

## 2021-01-04 DIAGNOSIS — M54.50 CHRONIC BILATERAL LOW BACK PAIN WITHOUT SCIATICA: ICD-10-CM

## 2021-01-04 DIAGNOSIS — G89.29 CHRONIC BILATERAL LOW BACK PAIN WITHOUT SCIATICA: ICD-10-CM

## 2021-01-04 RX ORDER — METHOCARBAMOL 750 MG/1
TABLET, FILM COATED ORAL
Qty: 30 TABLET | Refills: 0 | Status: SHIPPED | OUTPATIENT
Start: 2021-01-04 | End: 2021-02-08 | Stop reason: SDUPTHER

## 2021-01-13 ENCOUNTER — IMMUNIZATIONS (OUTPATIENT)
Dept: FAMILY MEDICINE CLINIC | Facility: HOSPITAL | Age: 56
End: 2021-01-13

## 2021-01-13 DIAGNOSIS — Z23 ENCOUNTER FOR IMMUNIZATION: ICD-10-CM

## 2021-01-13 PROCEDURE — 0011A SARS-COV-2 / COVID-19 MRNA VACCINE (MODERNA) 100 MCG: CPT

## 2021-01-13 PROCEDURE — 91301 SARS-COV-2 / COVID-19 MRNA VACCINE (MODERNA) 100 MCG: CPT

## 2021-02-08 DIAGNOSIS — G89.29 CHRONIC BILATERAL LOW BACK PAIN WITHOUT SCIATICA: ICD-10-CM

## 2021-02-08 DIAGNOSIS — N52.9 ERECTILE DYSFUNCTION, UNSPECIFIED ERECTILE DYSFUNCTION TYPE: ICD-10-CM

## 2021-02-08 DIAGNOSIS — M54.50 CHRONIC BILATERAL LOW BACK PAIN WITHOUT SCIATICA: ICD-10-CM

## 2021-02-08 RX ORDER — METHOCARBAMOL 750 MG/1
750 TABLET, FILM COATED ORAL
Qty: 30 TABLET | Refills: 0 | Status: SHIPPED | OUTPATIENT
Start: 2021-02-08 | End: 2021-03-18

## 2021-02-08 RX ORDER — SILDENAFIL 100 MG/1
100 TABLET, FILM COATED ORAL AS NEEDED
Qty: 18 TABLET | Refills: 0 | Status: SHIPPED | OUTPATIENT
Start: 2021-02-08 | End: 2021-05-30 | Stop reason: SDUPTHER

## 2021-02-10 ENCOUNTER — IMMUNIZATIONS (OUTPATIENT)
Dept: FAMILY MEDICINE CLINIC | Facility: HOSPITAL | Age: 56
End: 2021-02-10

## 2021-02-10 DIAGNOSIS — Z23 ENCOUNTER FOR IMMUNIZATION: Primary | ICD-10-CM

## 2021-02-10 PROCEDURE — 91301 SARS-COV-2 / COVID-19 MRNA VACCINE (MODERNA) 100 MCG: CPT

## 2021-02-10 PROCEDURE — 0012A SARS-COV-2 / COVID-19 MRNA VACCINE (MODERNA) 100 MCG: CPT

## 2021-03-01 ENCOUNTER — TELEPHONE (OUTPATIENT)
Dept: OBGYN CLINIC | Facility: HOSPITAL | Age: 56
End: 2021-03-01

## 2021-03-01 DIAGNOSIS — M17.0 PRIMARY OSTEOARTHRITIS OF BOTH KNEES: Primary | ICD-10-CM

## 2021-03-01 NOTE — TELEPHONE ENCOUNTER
Patient is ready for his next set of gel injections  Please order and pre-authorize  His last injection was in July 2020 with Dr Galilea Brooke

## 2021-03-17 DIAGNOSIS — G89.29 CHRONIC BILATERAL LOW BACK PAIN WITHOUT SCIATICA: ICD-10-CM

## 2021-03-17 DIAGNOSIS — M54.50 CHRONIC BILATERAL LOW BACK PAIN WITHOUT SCIATICA: ICD-10-CM

## 2021-03-18 RX ORDER — METHOCARBAMOL 750 MG/1
TABLET, FILM COATED ORAL
Qty: 30 TABLET | Refills: 0 | Status: SHIPPED | OUTPATIENT
Start: 2021-03-18 | End: 2021-05-30 | Stop reason: SDUPTHER

## 2021-04-02 ENCOUNTER — OFFICE VISIT (OUTPATIENT)
Dept: OBGYN CLINIC | Facility: CLINIC | Age: 56
End: 2021-04-02
Payer: COMMERCIAL

## 2021-04-02 VITALS
HEART RATE: 92 BPM | DIASTOLIC BLOOD PRESSURE: 72 MMHG | BODY MASS INDEX: 27.85 KG/M2 | SYSTOLIC BLOOD PRESSURE: 133 MMHG | WEIGHT: 224 LBS | HEIGHT: 75 IN

## 2021-04-02 DIAGNOSIS — M17.12 PRIMARY OSTEOARTHRITIS OF LEFT KNEE: ICD-10-CM

## 2021-04-02 DIAGNOSIS — M17.11 PRIMARY OSTEOARTHRITIS OF RIGHT KNEE: Primary | ICD-10-CM

## 2021-04-02 PROCEDURE — 20610 DRAIN/INJ JOINT/BURSA W/O US: CPT | Performed by: PHYSICIAN ASSISTANT

## 2021-04-02 RX ORDER — LIDOCAINE HYDROCHLORIDE 10 MG/ML
8 INJECTION, SOLUTION INFILTRATION; PERINEURAL
Status: COMPLETED | OUTPATIENT
Start: 2021-04-02 | End: 2021-04-02

## 2021-04-02 RX ADMIN — LIDOCAINE HYDROCHLORIDE 8 ML: 10 INJECTION, SOLUTION INFILTRATION; PERINEURAL at 13:20

## 2021-04-02 NOTE — PROGRESS NOTES
60 y o  male presents for Durolane injection to the bilateral knees  Patient notes previous good benefit with Durolane injection, better than with Synvisc  He notes his right knee is more symptomatic knee  Review of Systems  Review of systems negative unless otherwise specified in HPI    Past Medical History  Past Medical History:   Diagnosis Date    Bilateral leg weakness     Fractures     Hypertension     Pneumonia      Past Surgical History  Past Surgical History:   Procedure Laterality Date    BACK SURGERY      thoracic    HERNIA REPAIR Bilateral     inguinal    KNEE ARTHROSCOPY Bilateral     KNEE SURGERY      DE EXC SKIN BENIG >4 CM TRUNK,ARM,LEG N/A 2/13/2019    Procedure: EXCISION LIPOMA TORSO;  Surgeon: Alfredo Velasquez DO;  Location: [facility];  Service: General     Current Medications  Current Outpatient Medications on File Prior to Visit   Medication Sig Dispense Refill    atorvastatin (LIPITOR) 20 mg tablet TAKE 1 TABLET BY MOUTH  DAILY 90 tablet 3    cetirizine (ZyrTEC) 10 mg tablet Take 10 mg by mouth      diphenhydrAMINE-APAP  MG TABS Take 1 tablet by mouth      irbesartan (AVAPRO) 150 mg tablet TAKE 1 TABLET BY MOUTH  DAILY AT BEDTIME 90 tablet 3    methocarbamol (ROBAXIN) 750 mg tablet TAKE 1 TABLET BY MOUTH  DAILY AT BEDTIME AS NEEDED  FOR MUSCLE SPASMS 30 tablet 0    montelukast (SINGULAIR) 10 mg tablet TAKE 1 TABLET BY MOUTH  DAILY AT BEDTIME 90 tablet 3    sildenafil (VIAGRA) 100 mg tablet Take 1 tablet (100 mg total) by mouth as needed for erectile dysfunction 18 tablet 0    VENTOLIN  (90 Base) MCG/ACT inhaler Inhale 2 puffs every 4 (four) hours as needed for shortness of breath 3 Inhaler 1     No current facility-administered medications on file prior to visit        Recent Labs [facility])  0   Lab Value Date/Time    HCT 43 0 04/15/2019 0705    HGB 14 6 04/15/2019 0705    WBC 5 91 04/15/2019 0705     Physical exam:  Body mass index is 28 kg/m²  · General: Awake, Alert, Oriented  · Eyes: Pupils equal, round and reactive to light  · Heart: regular rate and rhythm  · Lungs: No audible wheezing  · Abdomen: soft  bilateral Knee exam  ·  right knee with positive effusion no erythema no warmth  Positive medial joint tenderness  No gross ligamentous laxity  Neurovascular intact  ·   Left knee  Trace effusion no erythema no warmth  Positive medial joint line tenderness no gross ligamentous laxity  Neurovascular intact  Imaging   none today    Assessment:  bilateral knee arthritis  Large joint arthrocentesis: R knee  Universal Protocol:  Consent: Verbal consent obtained  Risks and benefits: risks, benefits and alternatives were discussed  Consent given by: patient  Time out: Immediately prior to procedure a "time out" was called to verify the correct patient, procedure, equipment, support staff and site/side marked as required  Timeout called at: 4/2/2021 1:17 PM   Patient understanding: patient states understanding of the procedure being performed  Site marked: the operative site was marked  Patient identity confirmed: verbally with patient    Supporting Documentation  Indications: pain   Procedure Details  Location: knee - R knee  Preparation: Patient was prepped and draped in the usual sterile fashion  Needle size: 18 G  Ultrasound guidance: no  Approach: superior  Medications administered: 3 mL sodium hyaluronate 60 MG/3ML; 8 mL lidocaine 1 %  Specialty Pharmacy Supplied: received medications from pharmacy  Aspirate amount (ml): 20 ml  Aspirate: clear    Patient tolerance: patient tolerated the procedure well with no immediate complications  Dressing:  Sterile dressing applied    Large joint arthrocentesis: L knee  Universal Protocol:  Consent: Verbal consent obtained    Risks and benefits: risks, benefits and alternatives were discussed  Consent given by: patient  Time out: Immediately prior to procedure a "time out" was called to verify the correct patient, procedure, equipment, support staff and site/side marked as required  Timeout called at: 4/2/2021 1:19 PM   Patient understanding: patient states understanding of the procedure being performed  Site marked: the operative site was marked  Patient identity confirmed: verbally with patient    Supporting Documentation  Indications: pain   Procedure Details  Location: knee - L knee  Preparation: Patient was prepped and draped in the usual sterile fashion  Needle size: 22 G  Ultrasound guidance: no  Approach: superior  Medications administered: 3 mL sodium hyaluronate 60 MG/3ML  Specialty Pharmacy Supplied: received medications from pharmacy  Patient tolerance: patient tolerated the procedure well with no immediate complications  Dressing:  Sterile dressing applied      Plan:   Patient will ice bilateral knee for 20 minutes 1-2 times today  Limit activity for the next 24 hours before resuming normal activity  Weightbearing as tolerated  Patient may use over-the-counter ibuprofen or Tylenol as needed for discomfort  Follow up in  4 months for repeat evaluation and decision on reordering  Viscoinjection  This note was created with voice recognition software

## 2021-04-02 NOTE — PATIENT INSTRUCTIONS
Patient will ice bilateral knee for 20 minutes 1-2 times today  Limit activity for the next 24 hours before resuming normal activity  Weightbearing as tolerated  Patient may use over-the-counter ibuprofen or Tylenol as needed for discomfort  Follow up in  4 months for repeat evaluation and decision on reordering  Viscoinjection

## 2021-04-10 DIAGNOSIS — J45.20 MILD INTERMITTENT REACTIVE AIRWAY DISEASE WITHOUT COMPLICATION: ICD-10-CM

## 2021-04-27 ENCOUNTER — TELEMEDICINE (OUTPATIENT)
Dept: FAMILY MEDICINE CLINIC | Facility: CLINIC | Age: 56
End: 2021-04-27
Payer: COMMERCIAL

## 2021-04-27 VITALS — TEMPERATURE: 97.5 F | BODY MASS INDEX: 27.85 KG/M2 | HEIGHT: 75 IN | WEIGHT: 224 LBS

## 2021-04-27 DIAGNOSIS — J30.1 SEASONAL ALLERGIC RHINITIS DUE TO POLLEN: ICD-10-CM

## 2021-04-27 DIAGNOSIS — J40 BRONCHITIS: Primary | ICD-10-CM

## 2021-04-27 PROCEDURE — 1036F TOBACCO NON-USER: CPT | Performed by: PHYSICIAN ASSISTANT

## 2021-04-27 PROCEDURE — 99213 OFFICE O/P EST LOW 20 MIN: CPT | Performed by: PHYSICIAN ASSISTANT

## 2021-04-27 PROCEDURE — 3008F BODY MASS INDEX DOCD: CPT | Performed by: PHYSICIAN ASSISTANT

## 2021-04-27 RX ORDER — METHYLPREDNISOLONE 4 MG/1
TABLET ORAL
Qty: 21 EACH | Refills: 0 | Status: SHIPPED | OUTPATIENT
Start: 2021-04-27 | End: 2021-05-21 | Stop reason: ALTCHOICE

## 2021-04-27 RX ORDER — AZITHROMYCIN 250 MG/1
TABLET, FILM COATED ORAL
Qty: 6 TABLET | Refills: 0 | Status: SHIPPED | OUTPATIENT
Start: 2021-04-27 | End: 2021-05-01

## 2021-04-27 NOTE — PROGRESS NOTES
Virtual Regular Visit      Assessment/Plan:    Problem List Items Addressed This Visit     None      Visit Diagnoses     Bronchitis    -  Primary    Relevant Medications    azithromycin (ZITHROMAX) 250 mg tablet    methylPREDNISolone 4 MG tablet therapy pack    Seasonal allergic rhinitis due to pollen        Relevant Medications    methylPREDNISolone 4 MG tablet therapy pack        Recommended that he restart his zyrtec  Continue flonase and singular  I will put in for zithromax and a medrol dose pack to help with his symptoms  He will notify us of any new or worsening symptoms  Reason for visit is   Chief Complaint   Patient presents with    Allergies     Sinus congestion and chest congestion, prone to pneumonia  has covid vaccine  Hard to get a breath sometimes due to phlem   Virtual Regular Visit        Encounter provider Carmie Mortimer, PA-C    Provider located at [address] [address]      Recent Visits  No visits were found meeting these conditions  Showing recent visits within past 7 days and meeting all other requirements     Today's Visits  Date Type Provider Dept   04/27/21 Telemedicine Carmie Mortimer, PA-C [facility]   Showing today's visits and meeting all other requirements     Future Appointments  No visits were found meeting these conditions  Showing future appointments within next 150 days and meeting all other requirements        The patient was identified by name and date of birth  Ashish Carternato was informed that this is a telemedicine visit and that the visit is being conducted through telephone  My office door was closed  No one else was in the room  He acknowledged consent and understanding of privacy and security of the video platform  The patient has agreed to participate and understands they can discontinue the visit at any time      It was my intent to perform this visit via video technology but the patient was not able to do a video connection so the visit was completed via audio telephone only  Patient is aware this is a billable service  Subjective  Inderjit Ortiz is a 60 y o  male  Shalonda Dimas is a 60year old male who is here today complaining of chest tightness, wheezing, runny nose, and itchy watery eyes for the past 2 weeks  It started after cutting the grass  He has a history of seasonal allergies  He has been taking his singular and flonase  He has been using his recuse inhaler more frequently due to the wheezing  He denies any chest pains, nausea, vomiting, diarrhea, loss of taste, loss of smell, fevers, chills, or body aches  He is fully vaccinated against COVID  He denies any COVID exposures  He has been taking Mucinex for the past 10 days without relief of his symptoms          Past Medical History:   Diagnosis Date    Bilateral leg weakness     Fractures     Hypertension     Pneumonia        Past Surgical History:   Procedure Laterality Date    BACK SURGERY      thoracic    HERNIA REPAIR Bilateral     inguinal    KNEE ARTHROSCOPY Bilateral     KNEE SURGERY      MD EXC SKIN BENIG >4 CM TRUNK,ARM,LEG N/A 2/13/2019    Procedure: EXCISION LIPOMA TORSO;  Surgeon: Shalom Escobar DO;  Location: [facility];  Service: General       Current Outpatient Medications   Medication Sig Dispense Refill    atorvastatin (LIPITOR) 20 mg tablet TAKE 1 TABLET BY MOUTH  DAILY 90 tablet 3    azithromycin (ZITHROMAX) 250 mg tablet Take 2 tablets today then 1 tablet daily x 4 days 6 tablet 0    cetirizine (ZyrTEC) 10 mg tablet Take 10 mg by mouth      diphenhydrAMINE-APAP  MG TABS Take 1 tablet by mouth      irbesartan (AVAPRO) 150 mg tablet TAKE 1 TABLET BY MOUTH  DAILY AT BEDTIME 90 tablet 3    methocarbamol (ROBAXIN) 750 mg tablet TAKE 1 TABLET BY MOUTH  DAILY AT BEDTIME AS NEEDED  FOR MUSCLE SPASMS 30 tablet 0    methylPREDNISolone 4 MG tablet therapy pack Use as directed on package 21 each 0  montelukast (SINGULAIR) 10 mg tablet TAKE 1 TABLET BY MOUTH  DAILY AT BEDTIME 90 tablet 3    sildenafil (VIAGRA) 100 mg tablet Take 1 tablet (100 mg total) by mouth as needed for erectile dysfunction 18 tablet 0    Ventolin  (90 Base) MCG/ACT inhaler USE 2 INHALATIONS BY MOUTH  EVERY 4 HOURS AS NEEDED FOR SHORTNESS OF BREATH 54 g 2     No current facility-administered medications for this visit  Allergies   Allergen Reactions    No Active Allergies        Review of Systems   Constitutional: Negative for chills, diaphoresis, fatigue and fever  HENT: Positive for congestion, postnasal drip, rhinorrhea and sneezing  Negative for ear pain, sore throat and trouble swallowing  Eyes: Positive for itching  Negative for pain and visual disturbance  Respiratory: Positive for cough, chest tightness and wheezing  Negative for apnea and shortness of breath  Cardiovascular: Negative for chest pain and palpitations  Gastrointestinal: Negative for abdominal pain, constipation, diarrhea, nausea and vomiting  Genitourinary: Negative for dysuria and hematuria  Musculoskeletal: Negative for arthralgias, gait problem and myalgias  Neurological: Negative for dizziness, syncope, weakness, light-headedness, numbness and headaches  Psychiatric/Behavioral: Negative for suicidal ideas  The patient is not nervous/anxious  Video Exam    Vitals:    04/27/21 1013   Temp: 97 5 °F (36 4 °C)   Weight: 102 kg (224 lb)   Height: 6' 3" (1 905 m)       Physical Exam  Vitals signs and nursing note reviewed  Pulmonary:      Effort: Pulmonary effort is normal  No respiratory distress (Patient is speaking in full, fluent sentences  He does not sound to be in any acute distress)  I spent 10 minutes directly with the patient during this visit      VIRTUAL VISIT DISCLAIMER    Madhu Friedman acknowledges that he has consented to an online visit or consultation   He understands that the online visit is based solely on information provided by him, and that, in the absence of a face-to-face physical evaluation by the physician, the diagnosis he receives is both limited and provisional in terms of accuracy and completeness  This is not intended to replace a full medical face-to-face evaluation by the physician  Jaycee Neri understands and accepts these terms

## 2021-05-21 ENCOUNTER — OFFICE VISIT (OUTPATIENT)
Dept: FAMILY MEDICINE CLINIC | Facility: CLINIC | Age: 56
End: 2021-05-21
Payer: COMMERCIAL

## 2021-05-21 VITALS
OXYGEN SATURATION: 98 % | WEIGHT: 225 LBS | BODY MASS INDEX: 27.98 KG/M2 | SYSTOLIC BLOOD PRESSURE: 112 MMHG | TEMPERATURE: 97.1 F | HEART RATE: 77 BPM | HEIGHT: 75 IN | DIASTOLIC BLOOD PRESSURE: 72 MMHG

## 2021-05-21 DIAGNOSIS — J45.20 MILD INTERMITTENT REACTIVE AIRWAY DISEASE WITHOUT COMPLICATION: ICD-10-CM

## 2021-05-21 DIAGNOSIS — I10 ESSENTIAL HYPERTENSION: ICD-10-CM

## 2021-05-21 DIAGNOSIS — Z00.00 ANNUAL PHYSICAL EXAM: Primary | ICD-10-CM

## 2021-05-21 DIAGNOSIS — Z12.5 SCREENING FOR PROSTATE CANCER: ICD-10-CM

## 2021-05-21 DIAGNOSIS — E78.2 MIXED HYPERLIPIDEMIA: ICD-10-CM

## 2021-05-21 PROBLEM — K42.9 UMBILICAL HERNIA WITHOUT OBSTRUCTION AND WITHOUT GANGRENE: Status: RESOLVED | Noted: 2019-01-29 | Resolved: 2021-05-21

## 2021-05-21 PROBLEM — S83.249A TEAR OF MEDIAL MENISCUS OF KNEE: Status: ACTIVE | Noted: 2021-05-21

## 2021-05-21 PROBLEM — J45.909 REACTIVE AIRWAY DISEASE: Status: ACTIVE | Noted: 2021-05-21

## 2021-05-21 PROBLEM — K42.9 UMBILICAL HERNIA: Status: RESOLVED | Noted: 2019-01-24 | Resolved: 2021-05-21

## 2021-05-21 PROCEDURE — 99396 PREV VISIT EST AGE 40-64: CPT | Performed by: PHYSICIAN ASSISTANT

## 2021-05-21 PROCEDURE — 3725F SCREEN DEPRESSION PERFORMED: CPT | Performed by: PHYSICIAN ASSISTANT

## 2021-05-21 PROCEDURE — 1036F TOBACCO NON-USER: CPT | Performed by: PHYSICIAN ASSISTANT

## 2021-05-21 PROCEDURE — 3008F BODY MASS INDEX DOCD: CPT | Performed by: PHYSICIAN ASSISTANT

## 2021-05-21 RX ORDER — FLUTICASONE PROPIONATE 110 UG/1
2 AEROSOL, METERED RESPIRATORY (INHALATION) 2 TIMES DAILY
Qty: 12 G | Refills: 0 | Status: SHIPPED | OUTPATIENT
Start: 2021-05-21 | End: 2021-07-17 | Stop reason: SDUPTHER

## 2021-05-21 NOTE — PATIENT INSTRUCTIONS

## 2021-05-21 NOTE — PROGRESS NOTES
[facility]    NAME: Mariah Hinton  AGE: 60 y o  SEX: male  : 1965     DATE: 2021     Assessment and Plan:     Problem List Items Addressed This Visit        Respiratory    Reactive airway disease    Relevant Medications    fluticasone (FLOVENT HFA) 110 MCG/ACT inhaler       Cardiovascular and Mediastinum    Essential hypertension    Relevant Orders    Comprehensive metabolic panel    CBC and differential       Other    Mixed hyperlipidemia    Relevant Orders    Lipid panel      Other Visit Diagnoses     Annual physical exam    -  Primary    Relevant Orders    Lipid panel    Comprehensive metabolic panel    PSA, Total Screen    CBC and differential    BMI 28 0-28 9,adult        Screening for prostate cancer        Relevant Orders    PSA, Total Screen        Routine labs ordered    Will add on Flovent  He will continue his allergy medication and albuterol as needed  Encouraged him to schedule an eye exam      BP is optimal  Continue same medications  He will continue to follow with chiropractor for his chronic back pain and orthopedics for his knee pain  Immunizations and preventive care screenings were discussed with patient today  Appropriate education was printed on patient's after visit summary  Counseling:  Alcohol/drug use: discussed moderation in alcohol intake, the recommendations for healthy alcohol use, and avoidance of illicit drug use  Dental Health: discussed importance of regular tooth brushing, flossing, and dental visits  Injury prevention: discussed safety/seat belts, safety helmets, smoke detectors, carbon dioxide detectors, and smoking near bedding or upholstery  · Exercise: the importance of regular exercise/physical activity was discussed  Recommend exercise 3-5 times per week for at least 30 minutes  No follow-ups on file       Chief Complaint:     Chief Complaint   Patient presents with    Annual Exam      History of Present Illness:     Adult Annual Physical   Patient here for a comprehensive physical exam  The patient reports problems - increased frequency of albuterol  He has noticed that his allergies are much worse  He is consist with his Singular, Flonase, and Zyrtec  However, he has tightness in his chest and needs to use his albuterol about 3 times daily  It is relieved by the albuterol  He was never on any maintenance inhalers  He denies any other concerns  He is up to date with his dental cleanings  He is due for an eye exam  He is still following Weight Watchers and doing very well  He is following with the chiropractor for his back pain, which is helping  He is also seeing [facility] for his knee pain  He received injections, which do provide temporary relief  Diet and Physical Activity  · Diet/Nutrition: well balanced diet  · Exercise: moderate cardiovascular exercise and 3-4 times a week on average  Depression Screening  PHQ-9 Depression Screening    PHQ-9:   Frequency of the following problems over the past two weeks:      Little interest or pleasure in doing things: 0 - not at all  Feeling down, depressed, or hopeless: 0 - not at all  PHQ-2 Score: 0       General Health  · Sleep: sleeps well  · Hearing: normal - bilateral   · Vision: most recent eye exam >1 year ago and wears glasses  · Dental: regular dental visits and brushes teeth once daily  Review of Systems:     Review of Systems   Constitutional: Negative for chills, diaphoresis, fatigue and fever  HENT: Negative for congestion, ear pain, postnasal drip, rhinorrhea, sneezing, sore throat and trouble swallowing  Eyes: Negative for pain and visual disturbance  Respiratory: Negative for apnea, cough, shortness of breath and wheezing  Cardiovascular: Negative for chest pain and palpitations  Gastrointestinal: Negative for abdominal pain, constipation, diarrhea, nausea and vomiting  Genitourinary: Negative for dysuria  Musculoskeletal: Positive for arthralgias and back pain  Negative for gait problem and myalgias  Neurological: Negative for dizziness, syncope, weakness, light-headedness, numbness and headaches  Psychiatric/Behavioral: Negative for suicidal ideas  The patient is not nervous/anxious         Past Medical History:     Past Medical History:   Diagnosis Date    Bilateral leg weakness     Fractures     Hypertension     Pneumonia       Past Surgical History:     Past Surgical History:   Procedure Laterality Date    BACK SURGERY      thoracic    HERNIA REPAIR Bilateral     inguinal    KNEE ARTHROSCOPY Bilateral     KNEE SURGERY      [place] EXC SKIN BENIG >4 CM TRUNK,ARM,LEG N/A 2/13/2019    Procedure: EXCISION LIPOMA TORSO;  Surgeon: Ana Simon DO;  Location: [facility];  Service: General      Family History:     Family History   Problem Relation Age of Onset    No Known Problems Mother     Hypertension Father     No Known Problems Family       Social History:        Social History     Socioeconomic History    Marital status:      Spouse name: None    Number of children: None    Years of education: None    Highest education level: None   Occupational History    Occupation:    Social Needs    Financial resource strain: None    Food insecurity     Worry: None     Inability: None    Transportation needs     Medical: None     Non-medical: None   Tobacco Use    Smoking status: Former Smoker    Smokeless tobacco: Former User    Tobacco comment: quit 15 yrs ago   Substance and Sexual Activity    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    Drug use: Not Currently     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    Sexual activity: Yes     Comment: Denied: History of sexually active with persons at risk for HIV-related disease - As per [employer]    Lifestyle    Physical activity     Days per week: None     Minutes per session: None    Stress: None   Relationships    Social connections     Talks on phone: None     Gets together: None     Attends Restorationist service: None     Active member of club or organization: None     Attends meetings of clubs or organizations: None     Relationship status: None    Intimate partner violence     Fear of current or ex partner: None     Emotionally abused: None     Physically abused: None     Forced sexual activity: None   Other Topics Concern    None   Social History Narrative    Denied: History of drug use - As per [employer]      Current Medications:     Current Outpatient Medications   Medication Sig Dispense Refill    atorvastatin (LIPITOR) 20 mg tablet TAKE 1 TABLET BY MOUTH  DAILY 90 tablet 3    cetirizine (ZyrTEC) 10 mg tablet Take 10 mg by mouth      irbesartan (AVAPRO) 150 mg tablet TAKE 1 TABLET BY MOUTH  DAILY AT BEDTIME 90 tablet 3    methocarbamol (ROBAXIN) 750 mg tablet TAKE 1 TABLET BY MOUTH  DAILY AT BEDTIME AS NEEDED  FOR MUSCLE SPASMS 30 tablet 0    montelukast (SINGULAIR) 10 mg tablet TAKE 1 TABLET BY MOUTH  DAILY AT BEDTIME 90 tablet 3    sildenafil (VIAGRA) 100 mg tablet Take 1 tablet (100 mg total) by mouth as needed for erectile dysfunction 18 tablet 0    Ventolin  (90 Base) MCG/ACT inhaler USE 2 INHALATIONS BY MOUTH  EVERY 4 HOURS AS NEEDED FOR SHORTNESS OF BREATH 54 g 2    diphenhydrAMINE-APAP  MG TABS Take 1 tablet by mouth      fluticasone (FLOVENT HFA) 110 MCG/ACT inhaler Inhale 2 puffs 2 (two) times a day Rinse mouth after use  12 g 0     No current facility-administered medications for this visit  Allergies: Allergies   Allergen Reactions    No Active Allergies       Physical Exam:     /72   Pulse 77   Temp (!) 97 1 °F (36 2 °C)   Ht 6' 3" (1 905 m)   Wt 102 kg (225 lb)   SpO2 98%   BMI 28 12 kg/m²     Physical Exam  Vitals signs and nursing note reviewed  Constitutional:       Appearance: He is well-developed     HENT:      Head: Normocephalic and atraumatic  Right Ear: Tympanic membrane, ear canal and external ear normal  There is no impacted cerumen  Left Ear: Tympanic membrane, ear canal and external ear normal  There is no impacted cerumen  Nose: Nose normal  No congestion or rhinorrhea  Mouth/Throat:      Mouth: Mucous membranes are moist       Pharynx: No oropharyngeal exudate or posterior oropharyngeal erythema  Eyes:      Conjunctiva/sclera: Conjunctivae normal    Neck:      Musculoskeletal: Normal range of motion and neck supple  Cardiovascular:      Rate and Rhythm: Normal rate and regular rhythm  Heart sounds: No murmur  No friction rub  No gallop  Pulmonary:      Effort: Pulmonary effort is normal  No respiratory distress  Breath sounds: Normal breath sounds  No wheezing, rhonchi or rales  Abdominal:      General: There is no distension  Palpations: Abdomen is soft  Tenderness: There is no abdominal tenderness  There is no guarding or rebound  Musculoskeletal: Normal range of motion  Right lower leg: No edema  Left lower leg: No edema  Skin:     General: Skin is warm and dry  Neurological:      Mental Status: He is alert  Psychiatric:         Mood and Affect: Mood normal          Behavior: Behavior normal          Thought Content:  Thought content normal          Judgment: Judgment normal           Lannis Kanner, PA-C  [address]

## 2021-05-22 DIAGNOSIS — N52.9 ERECTILE DYSFUNCTION, UNSPECIFIED ERECTILE DYSFUNCTION TYPE: ICD-10-CM

## 2021-05-22 DIAGNOSIS — G89.29 CHRONIC BILATERAL LOW BACK PAIN WITHOUT SCIATICA: ICD-10-CM

## 2021-05-22 DIAGNOSIS — E78.2 MIXED HYPERLIPIDEMIA: ICD-10-CM

## 2021-05-22 DIAGNOSIS — Z91.09 ENVIRONMENTAL ALLERGIES: ICD-10-CM

## 2021-05-22 DIAGNOSIS — M54.50 CHRONIC BILATERAL LOW BACK PAIN WITHOUT SCIATICA: ICD-10-CM

## 2021-05-23 RX ORDER — ATORVASTATIN CALCIUM 20 MG/1
TABLET, FILM COATED ORAL
Qty: 90 TABLET | Refills: 3 | OUTPATIENT
Start: 2021-05-23

## 2021-05-23 RX ORDER — METHOCARBAMOL 750 MG/1
TABLET, FILM COATED ORAL
Qty: 30 TABLET | Refills: 0 | OUTPATIENT
Start: 2021-05-23

## 2021-05-23 RX ORDER — SILDENAFIL 100 MG/1
TABLET, FILM COATED ORAL
Qty: 18 TABLET | Refills: 0 | OUTPATIENT
Start: 2021-05-23

## 2021-05-23 RX ORDER — MONTELUKAST SODIUM 10 MG/1
TABLET ORAL
Qty: 90 TABLET | Refills: 3 | OUTPATIENT
Start: 2021-05-23

## 2021-05-30 DIAGNOSIS — N52.9 ERECTILE DYSFUNCTION, UNSPECIFIED ERECTILE DYSFUNCTION TYPE: ICD-10-CM

## 2021-05-30 DIAGNOSIS — G89.29 CHRONIC BILATERAL LOW BACK PAIN WITHOUT SCIATICA: ICD-10-CM

## 2021-05-30 DIAGNOSIS — Z91.09 ENVIRONMENTAL ALLERGIES: ICD-10-CM

## 2021-05-30 DIAGNOSIS — M54.50 CHRONIC BILATERAL LOW BACK PAIN WITHOUT SCIATICA: ICD-10-CM

## 2021-06-01 RX ORDER — METHOCARBAMOL 750 MG/1
750 TABLET, FILM COATED ORAL
Qty: 30 TABLET | Refills: 3 | Status: SHIPPED | OUTPATIENT
Start: 2021-06-01 | End: 2021-07-23 | Stop reason: SDUPTHER

## 2021-06-01 RX ORDER — MONTELUKAST SODIUM 10 MG/1
10 TABLET ORAL
Qty: 90 TABLET | Refills: 3 | Status: SHIPPED | OUTPATIENT
Start: 2021-06-01 | End: 2021-10-05 | Stop reason: SDUPTHER

## 2021-06-01 RX ORDER — SILDENAFIL 100 MG/1
100 TABLET, FILM COATED ORAL AS NEEDED
Qty: 18 TABLET | Refills: 3 | Status: SHIPPED | OUTPATIENT
Start: 2021-06-01 | End: 2021-08-03 | Stop reason: ALTCHOICE

## 2021-06-18 DIAGNOSIS — E78.2 MIXED HYPERLIPIDEMIA: ICD-10-CM

## 2021-06-21 RX ORDER — ATORVASTATIN CALCIUM 20 MG/1
TABLET, FILM COATED ORAL
Qty: 90 TABLET | Refills: 3 | Status: SHIPPED | OUTPATIENT
Start: 2021-06-21 | End: 2021-10-05 | Stop reason: SDUPTHER

## 2021-07-12 ENCOUNTER — TELEMEDICINE (OUTPATIENT)
Dept: FAMILY MEDICINE CLINIC | Facility: CLINIC | Age: 56
End: 2021-07-12
Payer: COMMERCIAL

## 2021-07-12 VITALS — BODY MASS INDEX: 27.98 KG/M2 | TEMPERATURE: 97.6 F | WEIGHT: 225 LBS | HEIGHT: 75 IN

## 2021-07-12 DIAGNOSIS — J98.8 RESPIRATORY INFECTION: Primary | ICD-10-CM

## 2021-07-12 PROCEDURE — 1036F TOBACCO NON-USER: CPT | Performed by: PHYSICIAN ASSISTANT

## 2021-07-12 PROCEDURE — 99213 OFFICE O/P EST LOW 20 MIN: CPT | Performed by: PHYSICIAN ASSISTANT

## 2021-07-12 PROCEDURE — 3008F BODY MASS INDEX DOCD: CPT | Performed by: PHYSICIAN ASSISTANT

## 2021-07-12 RX ORDER — PREDNISONE 10 MG/1
TABLET ORAL
Qty: 20 TABLET | Refills: 0 | Status: SHIPPED | OUTPATIENT
Start: 2021-07-12

## 2021-07-12 RX ORDER — AZITHROMYCIN 250 MG/1
TABLET, FILM COATED ORAL
Qty: 6 TABLET | Refills: 0 | Status: SHIPPED | OUTPATIENT
Start: 2021-07-12 | End: 2021-07-16

## 2021-07-12 NOTE — PROGRESS NOTES
Virtual Brief Visit    Assessment/Plan:    Problem List Items Addressed This Visit     None      Visit Diagnoses     Respiratory infection    -  Primary    Relevant Medications    azithromycin (Zithromax) 250 mg tablet    predniSONE 10 mg tablet    BMI 28 0-28 9,adult                    Reason for visit is   Chief Complaint   Patient presents with    Cold Like Symptoms     Started last Thursday people at work started getting sick, he started with sore throat, sinus congestion, sinus pressure, fatigue, cough  Sunday started to feel a little better, patient takes allergy medication year round and is prone to pneumonia  Now has chest congestion  Wanted to catch it before gets that far  Had rapid COVID test today at work just in case and was negative   Virtual Brief Visit        Encounter provider Luisito Cooper PA-C    Provider located at [address] [address]    Recent Visits  No visits were found meeting these conditions  Showing recent visits within past 7 days and meeting all other requirements  Today's Visits  Date Type Provider Dept   07/12/21 Telemedicine Luisito Cooper PA-C [facility]   Showing today's visits and meeting all other requirements  Future Appointments  No visits were found meeting these conditions  Showing future appointments within next 150 days and meeting all other requirements       After connecting through telephone, the patient was identified by name and date of birth  Madison Dewitt was informed that this is a telemedicine visit and that the visit is being conducted through telephone  My office door was closed  No one else was in the room  He acknowledged consent and understanding of privacy and security of the platform  The patient has agreed to participate and understands he can discontinue the visit at any time  Patient is aware this is a billable service  Subjective    Madison Dewitt is a 60 y o  male     [facility] calls today complaining of sinus symptoms since 7/8/21  Complaining of runny nose/congestion, sore throat, itchy eyes, cough  Had Rapid covid test this morning (negative)  Past Medical History:   Diagnosis Date    Bilateral leg weakness     Fractures     Hypertension     Pneumonia        Past Surgical History:   Procedure Laterality Date    BACK SURGERY      thoracic    HERNIA REPAIR Bilateral     inguinal    KNEE ARTHROSCOPY Bilateral     KNEE SURGERY      [place] EXC SKIN BENIG >4 CM TRUNK,ARM,LEG N/A 2/13/2019    Procedure: EXCISION LIPOMA TORSO;  Surgeon: Jayden Keenan DO;  Location: [facility];  Service: General       Current Outpatient Medications   Medication Sig Dispense Refill    atorvastatin (LIPITOR) 20 mg tablet TAKE 1 TABLET BY MOUTH  DAILY 90 tablet 3    cetirizine (ZyrTEC) 10 mg tablet Take 10 mg by mouth      diphenhydrAMINE-APAP  MG TABS Take 1 tablet by mouth      fluticasone (FLOVENT HFA) 110 MCG/ACT inhaler Inhale 2 puffs 2 (two) times a day Rinse mouth after use  12 g 0    irbesartan (AVAPRO) 150 mg tablet TAKE 1 TABLET BY MOUTH  DAILY AT BEDTIME 90 tablet 3    methocarbamol (ROBAXIN) 750 mg tablet Take 1 tablet (750 mg total) by mouth daily at bedtime as needed for muscle spasms 30 tablet 3    montelukast (SINGULAIR) 10 mg tablet Take 1 tablet (10 mg total) by mouth daily at bedtime 90 tablet 3    sildenafil (VIAGRA) 100 mg tablet Take 1 tablet (100 mg total) by mouth as needed for erectile dysfunction 18 tablet 3    Ventolin  (90 Base) MCG/ACT inhaler USE 2 INHALATIONS BY MOUTH  EVERY 4 HOURS AS NEEDED FOR SHORTNESS OF BREATH 54 g 2    azithromycin (Zithromax) 250 mg tablet Day 1 take 2 tablets, days 2-5 take 1 tablet  6 tablet 0    predniSONE 10 mg tablet Days 1 and 2 take 4 tablets daily, days 3 and 4 take 3 tablets daily, days 5 and 6 and 2 tablets daily, days 7 and 8 take 1 tablet daily   20 tablet 0     No current facility-administered medications for this visit  Allergies   Allergen Reactions    No Active Allergies        Review of Systems   Constitutional: Negative for activity change, appetite change, chills, diaphoresis, fatigue, fever and unexpected weight change  HENT: Positive for congestion, rhinorrhea and sore throat  Negative for ear pain, postnasal drip, sinus pressure, sinus pain, sneezing, tinnitus and voice change  Eyes: Positive for itching  Negative for pain, redness and visual disturbance  Respiratory: Positive for cough  Negative for chest tightness, shortness of breath and wheezing  Cardiovascular: Negative for chest pain, palpitations and leg swelling  Gastrointestinal: Negative for abdominal pain, blood in stool, constipation, diarrhea, nausea and vomiting  Genitourinary: Negative for difficulty urinating, dysuria, frequency, hematuria and urgency  Musculoskeletal: Negative for arthralgias, back pain, gait problem, joint swelling, myalgias, neck pain and neck stiffness  Skin: Negative for color change, pallor, rash and wound  Neurological: Negative for dizziness, tremors, weakness, light-headedness and headaches  Psychiatric/Behavioral: Negative for dysphoric mood, self-injury, sleep disturbance and suicidal ideas  The patient is not nervous/anxious  Vitals:    07/12/21 1227   Temp: 97 6 °F (36 4 °C)   Weight: 102 kg (225 lb)   Height: 6' 3" (1 905 m)         I spent 10 minutes directly with the patient during this visit    VIRTUAL VISIT DISCLAIMER    Jefferson Hernández acknowledges that he has consented to an online visit or consultation  He understands that the online visit is based solely on information provided by him, and that, in the absence of a face-to-face physical evaluation by the physician, the diagnosis he receives is both limited and provisional in terms of accuracy and completeness  This is not intended to replace a full medical face-to-face evaluation by the physician   Jefferson Hernández understands and accepts these terms  It was my intent to perform this visit via video technology but the patient was not able to do a video connection so the visit was completed via audio telephone only  BMI Counseling: Body mass index is 28 12 kg/m²  The BMI is above normal  Nutrition recommendations include 3-5 servings of fruits/vegetables daily

## 2021-07-23 DIAGNOSIS — G89.29 CHRONIC BILATERAL LOW BACK PAIN WITHOUT SCIATICA: ICD-10-CM

## 2021-07-23 DIAGNOSIS — J45.20 MILD INTERMITTENT REACTIVE AIRWAY DISEASE WITHOUT COMPLICATION: ICD-10-CM

## 2021-07-23 DIAGNOSIS — M54.50 CHRONIC BILATERAL LOW BACK PAIN WITHOUT SCIATICA: ICD-10-CM

## 2021-07-26 RX ORDER — FLUTICASONE PROPIONATE 110 UG/1
2 AEROSOL, METERED RESPIRATORY (INHALATION) 2 TIMES DAILY
Qty: 12 G | Refills: 0 | Status: SHIPPED | OUTPATIENT
Start: 2021-07-26

## 2021-07-26 RX ORDER — METHOCARBAMOL 750 MG/1
750 TABLET, FILM COATED ORAL
Qty: 30 TABLET | Refills: 0 | Status: SHIPPED | OUTPATIENT
Start: 2021-07-26 | End: 2021-08-24 | Stop reason: SDUPTHER

## 2021-08-02 ENCOUNTER — TELEPHONE (OUTPATIENT)
Dept: FAMILY MEDICINE CLINIC | Facility: CLINIC | Age: 56
End: 2021-08-02

## 2021-08-02 NOTE — TELEPHONE ENCOUNTER
Please call patient and confirm the following:    He has been taking Viagra  Is he wishing to switch to Cialis or does he need a refill on the Viagra?

## 2021-08-02 NOTE — TELEPHONE ENCOUNTER
----- Message from Ibis Ochoa sent at 8/2/2021  6:49 AM EDT -----  Regarding: FW: Prescription Question  Contact: [phone]    ----- Message -----  From: Miguel Angel Calderon  Sent: 7/31/2021  10:43 PM EDT  To: [facility]  Subject: Prescription Question                            Bigg Villasenor, Do you think you could prescribe Cialis for me >?? Thanks so much   Beto Young

## 2021-08-03 DIAGNOSIS — N52.9 ERECTILE DYSFUNCTION, UNSPECIFIED ERECTILE DYSFUNCTION TYPE: Primary | ICD-10-CM

## 2021-08-03 RX ORDER — TADALAFIL 10 MG/1
10 TABLET ORAL DAILY PRN
Qty: 10 TABLET | Refills: 2 | Status: SHIPPED | OUTPATIENT
Start: 2021-08-03 | End: 2021-08-09 | Stop reason: SDUPTHER

## 2021-08-05 ENCOUNTER — TELEPHONE (OUTPATIENT)
Dept: OBGYN CLINIC | Facility: CLINIC | Age: 56
End: 2021-08-05

## 2021-08-09 DIAGNOSIS — N52.9 ERECTILE DYSFUNCTION, UNSPECIFIED ERECTILE DYSFUNCTION TYPE: ICD-10-CM

## 2021-08-09 RX ORDER — TADALAFIL 10 MG/1
10 TABLET ORAL DAILY PRN
Qty: 18 TABLET | Refills: 2 | Status: SHIPPED | OUTPATIENT
Start: 2021-08-09 | End: 2021-11-05 | Stop reason: SDUPTHER

## 2021-08-09 NOTE — TELEPHONE ENCOUNTER
----- Message from Becky Smith sent at 8/9/2021  8:47 AM EDT -----  Regarding: FW: Prescription Question  Contact: [phone]    ----- Message -----  From: Garland Huntley  Sent: 8/6/2021   7:03 PM EDT  To: [address] [facility]  Subject: Prescription Question                            Hello, I recently switched to  shaniquas and Rani Parent ordered it for me but she needs to order less then 30 I believe that it's 18   Could someone please change the prescription with [employer]? ?

## 2021-08-10 DIAGNOSIS — I10 ESSENTIAL HYPERTENSION: ICD-10-CM

## 2021-08-11 RX ORDER — IRBESARTAN 150 MG/1
TABLET ORAL
Qty: 90 TABLET | Refills: 3 | Status: SHIPPED | OUTPATIENT
Start: 2021-08-11 | End: 2021-10-05 | Stop reason: SDUPTHER

## 2021-08-24 DIAGNOSIS — M54.50 CHRONIC BILATERAL LOW BACK PAIN WITHOUT SCIATICA: ICD-10-CM

## 2021-08-24 DIAGNOSIS — G89.29 CHRONIC BILATERAL LOW BACK PAIN WITHOUT SCIATICA: ICD-10-CM

## 2021-08-25 RX ORDER — METHOCARBAMOL 750 MG/1
750 TABLET, FILM COATED ORAL
Qty: 30 TABLET | Refills: 0 | Status: SHIPPED | OUTPATIENT
Start: 2021-08-25 | End: 2021-09-20 | Stop reason: SDUPTHER

## 2021-08-31 ENCOUNTER — OFFICE VISIT (OUTPATIENT)
Dept: OBGYN CLINIC | Facility: CLINIC | Age: 56
End: 2021-08-31
Payer: COMMERCIAL

## 2021-08-31 VITALS — HEIGHT: 75 IN | WEIGHT: 229 LBS | BODY MASS INDEX: 28.47 KG/M2

## 2021-08-31 DIAGNOSIS — M17.0 PRIMARY OSTEOARTHRITIS OF BOTH KNEES: Primary | ICD-10-CM

## 2021-08-31 PROCEDURE — 3008F BODY MASS INDEX DOCD: CPT | Performed by: PHYSICIAN ASSISTANT

## 2021-08-31 PROCEDURE — 20610 DRAIN/INJ JOINT/BURSA W/O US: CPT | Performed by: PHYSICIAN ASSISTANT

## 2021-08-31 PROCEDURE — 99212 OFFICE O/P EST SF 10 MIN: CPT | Performed by: PHYSICIAN ASSISTANT

## 2021-08-31 PROCEDURE — 1036F TOBACCO NON-USER: CPT | Performed by: PHYSICIAN ASSISTANT

## 2021-08-31 RX ORDER — BETAMETHASONE SODIUM PHOSPHATE AND BETAMETHASONE ACETATE 3; 3 MG/ML; MG/ML
6 INJECTION, SUSPENSION INTRA-ARTICULAR; INTRALESIONAL; INTRAMUSCULAR; SOFT TISSUE
Status: COMPLETED | OUTPATIENT
Start: 2021-08-31 | End: 2021-08-31

## 2021-08-31 RX ORDER — LIDOCAINE HYDROCHLORIDE 10 MG/ML
2 INJECTION, SOLUTION INFILTRATION; PERINEURAL
Status: COMPLETED | OUTPATIENT
Start: 2021-08-31 | End: 2021-08-31

## 2021-08-31 RX ADMIN — LIDOCAINE HYDROCHLORIDE 2 ML: 10 INJECTION, SOLUTION INFILTRATION; PERINEURAL at 16:03

## 2021-08-31 RX ADMIN — BETAMETHASONE SODIUM PHOSPHATE AND BETAMETHASONE ACETATE 6 MG: 3; 3 INJECTION, SUSPENSION INTRA-ARTICULAR; INTRALESIONAL; INTRAMUSCULAR; SOFT TISSUE at 16:03

## 2021-08-31 NOTE — PATIENT INSTRUCTIONS
Patient will ice knee 20 minutes once or twice today  Normal activity tomorrow  We will submit for repeat Durolane injection as he gets benefit from this  Patient was instructed that he could receive the Durolane injections in October but if he is still having good benefit from the cortisone to push that back as long as he can  The appointment will be scheduled 6 months from his last appointment  Activities as tolerated  We will get new x-rays of both knees at his next office visit

## 2021-08-31 NOTE — PROGRESS NOTES
60 y o male presents for 4 month follow-up bilateral knees status post Durolane injections  He notes he got benefit with the Durolane injections but is requesting cortisone this point time is Durolane is worn off  He notes his right knee is his more symptomatic knee  He feels his right knee is tight and swollen  Review of Systems  Review of systems negative unless otherwise specified in HPI    Past Medical History  Past Medical History:   Diagnosis Date    Bilateral leg weakness     Fractures     Hypertension     Pneumonia      Past Surgical History  Past Surgical History:   Procedure Laterality Date    BACK SURGERY      thoracic    HERNIA REPAIR Bilateral     inguinal    KNEE ARTHROSCOPY Bilateral     KNEE SURGERY      [place] EXC SKIN BENIG >4 CM TRUNK,ARM,LEG N/A 2/13/2019    Procedure: EXCISION LIPOMA TORSO;  Surgeon: Wilder Rai DO;  Location: [facility];  Service: General     Current Medications  Current Outpatient Medications on File Prior to Visit   Medication Sig Dispense Refill    atorvastatin (LIPITOR) 20 mg tablet TAKE 1 TABLET BY MOUTH  DAILY 90 tablet 3    cetirizine (ZyrTEC) 10 mg tablet Take 10 mg by mouth      diphenhydrAMINE-APAP  MG TABS Take 1 tablet by mouth      fluticasone (FLOVENT HFA) 110 MCG/ACT inhaler Inhale 2 puffs 2 (two) times a day Rinse mouth after use  12 g 0    irbesartan (AVAPRO) 150 mg tablet TAKE 1 TABLET BY MOUTH  DAILY AT BEDTIME 90 tablet 3    methocarbamol (ROBAXIN) 750 mg tablet Take 1 tablet (750 mg total) by mouth daily at bedtime as needed for muscle spasms 30 tablet 0    montelukast (SINGULAIR) 10 mg tablet Take 1 tablet (10 mg total) by mouth daily at bedtime 90 tablet 3    predniSONE 10 mg tablet Days 1 and 2 take 4 tablets daily, days 3 and 4 take 3 tablets daily, days 5 and 6 and 2 tablets daily, days 7 and 8 take 1 tablet daily   20 tablet 0    tadalafil (CIALIS) 10 MG tablet Take 1 tablet (10 mg total) by mouth daily as needed for erectile dysfunction 18 tablet 2    Ventolin  (90 Base) MCG/ACT inhaler Inhale 2 puffs every 4 (four) hours as needed for wheezing or shortness of breath 54 g 0     No current facility-administered medications on file prior to visit  Recent Labs [facility])  0   Lab Value Date/Time    HCT 43 0 04/15/2019 0705    HGB 14 6 04/15/2019 0705    WBC 5 91 04/15/2019 0705     Physical exam  Body mass index is 28 62 kg/m²  · General: Awake, Alert, Oriented  · Eyes: Pupils equal, round and reactive to light  · Heart: regular rate and rhythm  · Lungs: No audible wheezing  · Abdomen: soft  bilateral Knee exam  Positive right knee effusion, no left knee effusion no signs infection  Patient able get the last 5° of extension both legs  No gross ligamentous laxity  Positive medial joint line tenderness bilaterally  Slight patellofemoral crepitation  Strength quads and hamstrings grossly maintained  Imaging  None today will get at next visit  1  Primary osteoarthritis of both knees      Assessment:  bilateral knee arthritis with right knee effusion  Large joint arthrocentesis: R knee  [place] Protocol:  Procedure performed by:  Consent: Verbal consent obtained  Risks and benefits: risks, benefits and alternatives were discussed  Consent given by: patient  Time out: Immediately prior to procedure a "time out" was called to verify the correct patient, procedure, equipment, support staff and site/side marked as required    Timeout called at: 8/31/2021 3:59 PM   Patient understanding: patient states understanding of the procedure being performed  Site marked: the operative site was marked  Patient identity confirmed: verbally with patient    Supporting Documentation  Indications: pain   Procedure Details  Location: knee - R knee  Preparation: Patient was prepped and draped in the usual sterile fashion  Needle size: 18 G  Ultrasound guidance: no  Approach: superior  Medications administered: 2 mL lidocaine 1 %; 6 mg betamethasone acetate-betamethasone sodium phosphate 6 (3-3) mg/mL    Aspirate amount (ml): 34 mL  Aspirate: clear and serous    Patient tolerance: patient tolerated the procedure well with no immediate complications  Dressing:  Sterile dressing applied    Large joint arthrocentesis: L knee  Universal Protocol:  Consent: Verbal consent obtained  Risks and benefits: risks, benefits and alternatives were discussed  Consent given by: patient  Time out: Immediately prior to procedure a "time out" was called to verify the correct patient, procedure, equipment, support staff and site/side marked as required  Timeout called at: 8/31/2021 4:00 PM   Patient understanding: patient states understanding of the procedure being performed  Site marked: the operative site was marked  Patient identity confirmed: verbally with patient    Supporting Documentation  Indications: pain   Procedure Details  Location: knee - L knee  Preparation: Patient was prepped and draped in the usual sterile fashion  Needle size: 22 G  Ultrasound guidance: no  Approach: anterolateral  Medications administered: 6 mg betamethasone acetate-betamethasone sodium phosphate 6 (3-3) mg/mL; 2 mL lidocaine 1 %    Patient tolerance: patient tolerated the procedure well with no immediate complications  Dressing:  Sterile dressing applied      Plan:  Patient will ice knee 20 minutes once or twice today  Normal activity tomorrow  We will submit for repeat Durolane injection as he gets benefit from this  Patient was instructed that he could receive the Durolane injections in October but if he is still having good benefit from the cortisone to push that back as long as he can  The appointment will be scheduled 6 months from his last appointment  Activities as tolerated  We will get new x-rays of both knees at his next office visit  This note was created with voice recognition software

## 2021-09-02 ENCOUNTER — TELEPHONE (OUTPATIENT)
Dept: FAMILY MEDICINE CLINIC | Facility: CLINIC | Age: 56
End: 2021-09-02

## 2021-09-02 NOTE — TELEPHONE ENCOUNTER
----- Message from Blanquita Velasquez sent at 9/2/2021  6:54 AM EDT -----  Regarding: FW: Prescription Question  Contact: [phone]    ----- Message -----  From: Nikki Noyola  Sent: 9/1/2021   6:59 PM EDT  [facility]  Subject: Prescription Question                            Josh Cushing, Is there any way you could could prescribe anything non narcotic to help with the nephropathy I have in my legs ? It keeps me up at nights  Thoughts?

## 2021-09-02 NOTE — TELEPHONE ENCOUNTER
Please notify patient that there are medications that can help with neuropathy  However, I do not like starting a new medication without seeing the patient in person  Please have his schedule a visit if he would like to discuss further

## 2021-10-04 ENCOUNTER — PROCEDURE VISIT (OUTPATIENT)
Dept: OBGYN CLINIC | Facility: CLINIC | Age: 56
End: 2021-10-04
Payer: COMMERCIAL

## 2021-10-04 VITALS
HEART RATE: 97 BPM | WEIGHT: 224 LBS | BODY MASS INDEX: 27.85 KG/M2 | HEIGHT: 75 IN | DIASTOLIC BLOOD PRESSURE: 92 MMHG | SYSTOLIC BLOOD PRESSURE: 146 MMHG

## 2021-10-04 DIAGNOSIS — M17.0 PRIMARY OSTEOARTHRITIS OF BOTH KNEES: Primary | ICD-10-CM

## 2021-10-04 PROCEDURE — 3008F BODY MASS INDEX DOCD: CPT | Performed by: ORTHOPAEDIC SURGERY

## 2021-10-04 PROCEDURE — 20610 DRAIN/INJ JOINT/BURSA W/O US: CPT | Performed by: ORTHOPAEDIC SURGERY

## 2021-10-04 PROCEDURE — 1036F TOBACCO NON-USER: CPT | Performed by: ORTHOPAEDIC SURGERY

## 2021-10-04 PROCEDURE — 99213 OFFICE O/P EST LOW 20 MIN: CPT | Performed by: ORTHOPAEDIC SURGERY

## 2021-10-05 DIAGNOSIS — G89.29 CHRONIC BILATERAL LOW BACK PAIN WITHOUT SCIATICA: ICD-10-CM

## 2021-10-05 DIAGNOSIS — Z91.09 ENVIRONMENTAL ALLERGIES: ICD-10-CM

## 2021-10-05 DIAGNOSIS — I10 ESSENTIAL HYPERTENSION: ICD-10-CM

## 2021-10-05 DIAGNOSIS — M54.50 CHRONIC BILATERAL LOW BACK PAIN WITHOUT SCIATICA: ICD-10-CM

## 2021-10-05 DIAGNOSIS — E78.2 MIXED HYPERLIPIDEMIA: ICD-10-CM

## 2021-10-05 DIAGNOSIS — J45.20 MILD INTERMITTENT REACTIVE AIRWAY DISEASE WITHOUT COMPLICATION: ICD-10-CM

## 2021-10-06 RX ORDER — ATORVASTATIN CALCIUM 20 MG/1
20 TABLET, FILM COATED ORAL DAILY
Qty: 90 TABLET | Refills: 0 | Status: SHIPPED | OUTPATIENT
Start: 2021-10-06 | End: 2022-02-21

## 2021-10-06 RX ORDER — MONTELUKAST SODIUM 10 MG/1
10 TABLET ORAL
Qty: 90 TABLET | Refills: 0 | Status: SHIPPED | OUTPATIENT
Start: 2021-10-06 | End: 2022-02-21

## 2021-10-06 RX ORDER — IRBESARTAN 150 MG/1
150 TABLET ORAL
Qty: 90 TABLET | Refills: 0 | Status: SHIPPED | OUTPATIENT
Start: 2021-10-06 | End: 2021-12-27

## 2021-10-06 RX ORDER — METHOCARBAMOL 750 MG/1
750 TABLET, FILM COATED ORAL
Qty: 30 TABLET | Refills: 0 | Status: SHIPPED | OUTPATIENT
Start: 2021-10-06 | End: 2021-11-05 | Stop reason: SDUPTHER

## 2021-10-08 ENCOUNTER — IMMUNIZATIONS (OUTPATIENT)
Dept: FAMILY MEDICINE CLINIC | Facility: CLINIC | Age: 56
End: 2021-10-08
Payer: COMMERCIAL

## 2021-10-08 DIAGNOSIS — Z23 NEED FOR INFLUENZA VACCINATION: Primary | ICD-10-CM

## 2021-10-08 PROCEDURE — 90682 RIV4 VACC RECOMBINANT DNA IM: CPT | Performed by: PHYSICIAN ASSISTANT

## 2021-10-08 PROCEDURE — 90471 IMMUNIZATION ADMIN: CPT | Performed by: PHYSICIAN ASSISTANT

## 2021-10-11 ENCOUNTER — TELEPHONE (OUTPATIENT)
Dept: FAMILY MEDICINE CLINIC | Facility: CLINIC | Age: 56
End: 2021-10-11

## 2021-11-05 DIAGNOSIS — M54.50 CHRONIC BILATERAL LOW BACK PAIN WITHOUT SCIATICA: ICD-10-CM

## 2021-11-05 DIAGNOSIS — N52.9 ERECTILE DYSFUNCTION, UNSPECIFIED ERECTILE DYSFUNCTION TYPE: ICD-10-CM

## 2021-11-05 DIAGNOSIS — G89.29 CHRONIC BILATERAL LOW BACK PAIN WITHOUT SCIATICA: ICD-10-CM

## 2021-11-08 RX ORDER — METHOCARBAMOL 750 MG/1
750 TABLET, FILM COATED ORAL
Qty: 30 TABLET | Refills: 0 | Status: SHIPPED | OUTPATIENT
Start: 2021-11-08 | End: 2021-12-27

## 2021-11-08 RX ORDER — TADALAFIL 10 MG/1
10 TABLET ORAL DAILY PRN
Qty: 18 TABLET | Refills: 0 | Status: SHIPPED | OUTPATIENT
Start: 2021-11-08 | End: 2022-01-30 | Stop reason: SDUPTHER

## 2021-11-18 ENCOUNTER — TELEPHONE (OUTPATIENT)
Dept: OBGYN CLINIC | Facility: HOSPITAL | Age: 56
End: 2021-11-18

## 2021-12-26 DIAGNOSIS — G89.29 CHRONIC BILATERAL LOW BACK PAIN WITHOUT SCIATICA: ICD-10-CM

## 2021-12-26 DIAGNOSIS — I10 ESSENTIAL HYPERTENSION: ICD-10-CM

## 2021-12-26 DIAGNOSIS — M54.50 CHRONIC BILATERAL LOW BACK PAIN WITHOUT SCIATICA: ICD-10-CM

## 2021-12-27 RX ORDER — IRBESARTAN 150 MG/1
TABLET ORAL
Qty: 90 TABLET | Refills: 3 | Status: SHIPPED | OUTPATIENT
Start: 2021-12-27

## 2021-12-27 RX ORDER — METHOCARBAMOL 750 MG/1
TABLET, FILM COATED ORAL
Qty: 30 TABLET | Refills: 0 | Status: SHIPPED | OUTPATIENT
Start: 2021-12-27 | End: 2022-04-03 | Stop reason: SDUPTHER

## 2022-02-14 ENCOUNTER — TELEPHONE (OUTPATIENT)
Dept: OBGYN CLINIC | Facility: CLINIC | Age: 57
End: 2022-02-14

## 2022-02-14 DIAGNOSIS — N52.9 ERECTILE DYSFUNCTION, UNSPECIFIED ERECTILE DYSFUNCTION TYPE: Primary | ICD-10-CM

## 2022-02-14 RX ORDER — SILDENAFIL 100 MG/1
100 TABLET, FILM COATED ORAL DAILY PRN
Qty: 20 TABLET | Refills: 2 | Status: SHIPPED | OUTPATIENT
Start: 2022-02-14 | End: 2022-02-15 | Stop reason: SDUPTHER

## 2022-02-15 DIAGNOSIS — N52.9 ERECTILE DYSFUNCTION, UNSPECIFIED ERECTILE DYSFUNCTION TYPE: ICD-10-CM

## 2022-02-16 RX ORDER — SILDENAFIL 100 MG/1
100 TABLET, FILM COATED ORAL DAILY PRN
Qty: 20 TABLET | Refills: 0 | Status: SHIPPED | OUTPATIENT
Start: 2022-02-16 | End: 2022-04-03 | Stop reason: SDUPTHER

## 2022-02-20 DIAGNOSIS — Z91.09 ENVIRONMENTAL ALLERGIES: ICD-10-CM

## 2022-02-20 DIAGNOSIS — E78.2 MIXED HYPERLIPIDEMIA: ICD-10-CM

## 2022-02-20 DIAGNOSIS — J45.20 MILD INTERMITTENT REACTIVE AIRWAY DISEASE WITHOUT COMPLICATION: ICD-10-CM

## 2022-02-21 RX ORDER — ATORVASTATIN CALCIUM 20 MG/1
TABLET, FILM COATED ORAL
Qty: 90 TABLET | Refills: 3 | Status: SHIPPED | OUTPATIENT
Start: 2022-02-21

## 2022-02-21 RX ORDER — MONTELUKAST SODIUM 10 MG/1
TABLET ORAL
Qty: 90 TABLET | Refills: 3 | Status: SHIPPED | OUTPATIENT
Start: 2022-02-21

## 2022-03-14 DIAGNOSIS — Z00.00 ANNUAL PHYSICAL EXAM: Primary | ICD-10-CM

## 2022-03-14 DIAGNOSIS — I10 ESSENTIAL HYPERTENSION: ICD-10-CM

## 2022-03-14 DIAGNOSIS — Z12.5 SCREENING FOR PROSTATE CANCER: ICD-10-CM

## 2022-03-14 DIAGNOSIS — E78.2 MIXED HYPERLIPIDEMIA: ICD-10-CM

## 2022-03-27 DIAGNOSIS — J45.20 MILD INTERMITTENT REACTIVE AIRWAY DISEASE WITHOUT COMPLICATION: ICD-10-CM

## 2022-03-28 NOTE — TELEPHONE ENCOUNTER
I would recommend that he come in for a visit if he is sick before I would prescribe an antibiotic and steroids  If he is out of the area, it would be best for him to be evaluated at Urgent Care  I will refill his albuterol for him  I sent that to the pharmacy that he requested in [place]

## 2022-04-03 DIAGNOSIS — N52.9 ERECTILE DYSFUNCTION, UNSPECIFIED ERECTILE DYSFUNCTION TYPE: ICD-10-CM

## 2022-04-03 DIAGNOSIS — M54.50 CHRONIC BILATERAL LOW BACK PAIN WITHOUT SCIATICA: ICD-10-CM

## 2022-04-03 DIAGNOSIS — G89.29 CHRONIC BILATERAL LOW BACK PAIN WITHOUT SCIATICA: ICD-10-CM

## 2022-04-04 DIAGNOSIS — N52.9 ERECTILE DYSFUNCTION, UNSPECIFIED ERECTILE DYSFUNCTION TYPE: ICD-10-CM

## 2022-04-04 RX ORDER — SILDENAFIL 100 MG/1
100 TABLET, FILM COATED ORAL DAILY PRN
Qty: 20 TABLET | Refills: 0 | OUTPATIENT
Start: 2022-04-04

## 2022-04-04 RX ORDER — METHOCARBAMOL 750 MG/1
750 TABLET, FILM COATED ORAL
Qty: 30 TABLET | Refills: 0 | Status: SHIPPED | OUTPATIENT
Start: 2022-04-04 | End: 2022-07-02 | Stop reason: SDUPTHER

## 2022-04-04 RX ORDER — SILDENAFIL 100 MG/1
100 TABLET, FILM COATED ORAL DAILY PRN
Qty: 20 TABLET | Refills: 0 | Status: SHIPPED | OUTPATIENT
Start: 2022-04-04 | End: 2022-04-18 | Stop reason: ALTCHOICE

## 2022-04-18 DIAGNOSIS — N52.9 ERECTILE DYSFUNCTION, UNSPECIFIED ERECTILE DYSFUNCTION TYPE: Primary | ICD-10-CM

## 2022-04-18 RX ORDER — TADALAFIL 10 MG/1
10 TABLET ORAL DAILY PRN
Qty: 20 TABLET | Refills: 2 | Status: SHIPPED | OUTPATIENT
Start: 2022-04-18 | End: 2022-04-20 | Stop reason: SDUPTHER

## 2022-04-20 DIAGNOSIS — N52.9 ERECTILE DYSFUNCTION, UNSPECIFIED ERECTILE DYSFUNCTION TYPE: ICD-10-CM

## 2022-04-21 RX ORDER — TADALAFIL 10 MG/1
10 TABLET ORAL DAILY PRN
Qty: 20 TABLET | Refills: 0 | Status: SHIPPED | OUTPATIENT
Start: 2022-04-21 | End: 2022-04-23 | Stop reason: SDUPTHER

## 2022-04-23 DIAGNOSIS — N52.9 ERECTILE DYSFUNCTION, UNSPECIFIED ERECTILE DYSFUNCTION TYPE: ICD-10-CM

## 2022-04-25 RX ORDER — TADALAFIL 10 MG/1
10 TABLET ORAL DAILY PRN
Qty: 20 TABLET | Refills: 0 | Status: SHIPPED | OUTPATIENT
Start: 2022-04-25 | End: 2022-05-11 | Stop reason: SDUPTHER

## 2022-04-27 ENCOUNTER — TELEPHONE (OUTPATIENT)
Dept: FAMILY MEDICINE CLINIC | Facility: CLINIC | Age: 57
End: 2022-04-27

## 2022-04-27 NOTE — TELEPHONE ENCOUNTER
Please notify patient that Cialis is not covered  No alternatives are covered  Must pay out of pocket

## 2022-05-08 DIAGNOSIS — J45.20 MILD INTERMITTENT REACTIVE AIRWAY DISEASE WITHOUT COMPLICATION: ICD-10-CM

## 2022-05-11 DIAGNOSIS — N52.9 ERECTILE DYSFUNCTION, UNSPECIFIED ERECTILE DYSFUNCTION TYPE: ICD-10-CM

## 2022-05-11 RX ORDER — TADALAFIL 20 MG/1
10 TABLET ORAL DAILY PRN
Qty: 30 TABLET | Refills: 0 | Status: SHIPPED | OUTPATIENT
Start: 2022-05-11 | End: 2022-06-09 | Stop reason: SDUPTHER

## 2022-05-12 NOTE — PROGRESS NOTES
Assessment/Plan:    Problem List Items Addressed This Visit        Cardiovascular and Mediastinum    Essential hypertension - Primary    Relevant Medications    irbesartan (AVAPRO) 150 mg tablet       Other    Mixed hyperlipidemia    Relevant Medications    atorvastatin (LIPITOR) 20 mg tablet      Other Visit Diagnoses     Need for influenza vaccination        Relevant Orders    influenza vaccine, [id number], quadrivalent, recombinant, PF, 0 5 mL, for patients 18 yr+ ([facility]) (Completed)           Diagnoses and all orders for this visit:    Essential hypertension  -     Discontinue: irbesartan (AVAPRO) 150 mg tablet; Take 1 tablet (150 mg total) by mouth daily at bedtime  -     irbesartan (AVAPRO) 150 mg tablet; Take 1 tablet (150 mg total) by mouth daily at bedtime    Mixed hyperlipidemia  -     Discontinue: atorvastatin (LIPITOR) 20 mg tablet; Take 1 tablet (20 mg total) by mouth daily  -     atorvastatin (LIPITOR) 20 mg tablet; Take 1 tablet (20 mg total) by mouth daily    Need for influenza vaccination  -     influenza vaccine, [id number], quadrivalent, recombinant, PF, 0 5 mL, for patients 18 yr+ ([facility])        Continue same medications  Labs reviewed with patient  Repeat BP was 136/90  He will continue to monitor and let us know if he is getting consistently elevated readings  Educated on decreasing his intake of sodium  Flu shot given  He will continue to follow with orthopedics  Follow-up in 6 months or sooner if needed  Subjective:      Patient ID: Jorden Bravo is a 62 y o  male  Rachel Hanson is a 62year old male who presents for a routine follow-up  He is doing much better since switching from lisinopril to irbesartan  He is no longer having a dry cough or issues with erectile dysfunction  He admits that he has been doing weight watchers, and was successfully losing weight  He has been exercising on a regular basis   He has not been watching his sodium over the past week because his wife has been away  He does not check his blood pressure regularly  He denies any headaches, vision changes, chest pains, shortness of breath, or abdominal pains  He is following with orthopedics for his bilateral osteoarthritis where he has been getting injections  He does not have any new problems or complaints  The following portions of the patient's history were reviewed and updated as appropriate:   He has a past medical history of Bilateral leg weakness, Fractures, Hypertension, and Pneumonia ,  does not have any pertinent problems on file  ,   has a past surgical history that includes Knee surgery; Knee arthroscopy (Bilateral); Hernia repair (Bilateral); Back surgery; and pr exc skin benig >4 cm trunk,arm,leg (N/A, 2/13/2019)  ,  family history includes Hypertension in his father; No Known Problems in his family and mother  ,   reports that he has quit smoking  He has quit using smokeless tobacco  He reports that he has current or past drug history  Drug: Marijuana  He reports that he does not drink alcohol ,  is allergic to no active allergies     Current Outpatient Medications   Medication Sig Dispense Refill    atorvastatin (LIPITOR) 20 mg tablet Take 1 tablet (20 mg total) by mouth daily 90 tablet 1    cetirizine (ZyrTEC) 10 mg tablet Take 10 mg by mouth      diphenhydrAMINE-APAP  MG TABS Take 1 tablet by mouth      irbesartan (AVAPRO) 150 mg tablet Take 1 tablet (150 mg total) by mouth daily at bedtime 90 tablet 1    methocarbamol (ROBAXIN) 750 mg tablet Take 750 mg by mouth daily at bedtime      VENTOLIN  (90 Base) MCG/ACT inhaler Inhale 2 puffs every 4 (four) hours as needed  0    sildenafil (VIAGRA) 100 mg tablet Take 1 tablet (100 mg total) by mouth daily as needed for erectile dysfunction 30 tablet 0     No current facility-administered medications for this visit  Review of Systems   Constitutional: Negative for chills, diaphoresis, fatigue and fever     HENT: Negative for congestion, ear pain, postnasal drip, rhinorrhea, sneezing, sore throat and trouble swallowing  Eyes: Negative for pain and visual disturbance  Respiratory: Negative for apnea, cough, shortness of breath and wheezing  Cardiovascular: Negative for chest pain and palpitations  Gastrointestinal: Negative for abdominal pain, constipation, diarrhea, nausea and vomiting  Genitourinary: Negative for dysuria and hematuria  Musculoskeletal: Positive for arthralgias (bilateral knee pain)  Negative for gait problem and myalgias  Neurological: Negative for dizziness, syncope, weakness, light-headedness, numbness and headaches  Psychiatric/Behavioral: Negative for suicidal ideas  The patient is not nervous/anxious  Objective:  Vitals:    09/25/19 0755   BP: 142/90   BP Location: Left arm   Patient Position: Sitting   Weight: 106 kg (232 lb 12 8 oz)   Height: 6' 3" (1 905 m)     Body mass index is 29 1 kg/m²  Physical Exam   Constitutional: He is oriented to person, place, and time  He appears well-developed and well-nourished  HENT:   Head: Normocephalic and atraumatic  Right Ear: Tympanic membrane, external ear and ear canal normal    Left Ear: Tympanic membrane, external ear and ear canal normal    Nose: Nose normal    Mouth/Throat: Oropharynx is clear and moist and mucous membranes are normal  No oropharyngeal exudate, posterior oropharyngeal edema or posterior oropharyngeal erythema  Eyes: Pupils are equal, round, and reactive to light  EOM are normal    Neck: Normal range of motion  Neck supple  Cardiovascular: Normal rate, regular rhythm and normal heart sounds  Exam reveals no gallop and no friction rub  No murmur heard  Pulmonary/Chest: Effort normal and breath sounds normal  No respiratory distress  He has no wheezes  He has no rales  Musculoskeletal: Normal range of motion   He exhibits tenderness (medial joint line tenderness of bilateral knees) and deformity (arthritic deformities of bilateral knees)  Lymphadenopathy:     He has no cervical adenopathy  Neurological: He is alert and oriented to person, place, and time  Skin: Skin is warm and dry  Psychiatric: He has a normal mood and affect  His behavior is normal  Judgment and thought content normal    Vitals reviewed  91

## 2022-05-27 DIAGNOSIS — J45.20 MILD INTERMITTENT REACTIVE AIRWAY DISEASE WITHOUT COMPLICATION: ICD-10-CM

## 2022-06-09 DIAGNOSIS — N52.9 ERECTILE DYSFUNCTION, UNSPECIFIED ERECTILE DYSFUNCTION TYPE: ICD-10-CM

## 2022-06-09 RX ORDER — TADALAFIL 20 MG/1
10 TABLET ORAL DAILY PRN
Qty: 60 TABLET | Refills: 0 | Status: SHIPPED | OUTPATIENT
Start: 2022-06-09 | End: 2022-08-09 | Stop reason: SDUPTHER

## 2022-07-02 DIAGNOSIS — M54.50 CHRONIC BILATERAL LOW BACK PAIN WITHOUT SCIATICA: ICD-10-CM

## 2022-07-02 DIAGNOSIS — G89.29 CHRONIC BILATERAL LOW BACK PAIN WITHOUT SCIATICA: ICD-10-CM

## 2022-07-05 RX ORDER — METHOCARBAMOL 750 MG/1
750 TABLET, FILM COATED ORAL
Qty: 30 TABLET | Refills: 0 | Status: SHIPPED | OUTPATIENT
Start: 2022-07-05 | End: 2022-08-04

## 2022-07-06 DIAGNOSIS — J45.20 MILD INTERMITTENT REACTIVE AIRWAY DISEASE WITHOUT COMPLICATION: ICD-10-CM

## 2022-08-04 DIAGNOSIS — G89.29 CHRONIC BILATERAL LOW BACK PAIN WITHOUT SCIATICA: ICD-10-CM

## 2022-08-04 DIAGNOSIS — M54.50 CHRONIC BILATERAL LOW BACK PAIN WITHOUT SCIATICA: ICD-10-CM

## 2022-08-04 RX ORDER — METHOCARBAMOL 750 MG/1
750 TABLET, FILM COATED ORAL
Qty: 30 TABLET | Refills: 0 | Status: SHIPPED | OUTPATIENT
Start: 2022-08-04 | End: 2022-10-03 | Stop reason: SDUPTHER

## 2022-08-09 DIAGNOSIS — N52.9 ERECTILE DYSFUNCTION, UNSPECIFIED ERECTILE DYSFUNCTION TYPE: ICD-10-CM

## 2022-08-09 RX ORDER — TADALAFIL 20 MG/1
10 TABLET ORAL DAILY PRN
Qty: 60 TABLET | Refills: 0 | Status: SHIPPED | OUTPATIENT
Start: 2022-08-09 | End: 2022-10-03 | Stop reason: SDUPTHER

## 2022-10-03 DIAGNOSIS — N52.9 ERECTILE DYSFUNCTION, UNSPECIFIED ERECTILE DYSFUNCTION TYPE: ICD-10-CM

## 2022-10-03 DIAGNOSIS — G89.29 CHRONIC BILATERAL LOW BACK PAIN WITHOUT SCIATICA: ICD-10-CM

## 2022-10-03 DIAGNOSIS — J45.20 MILD INTERMITTENT REACTIVE AIRWAY DISEASE WITHOUT COMPLICATION: ICD-10-CM

## 2022-10-03 DIAGNOSIS — M54.50 CHRONIC BILATERAL LOW BACK PAIN WITHOUT SCIATICA: ICD-10-CM

## 2022-10-03 RX ORDER — TADALAFIL 20 MG/1
10 TABLET ORAL DAILY PRN
Qty: 60 TABLET | Refills: 0 | Status: SHIPPED | OUTPATIENT
Start: 2022-10-03 | End: 2022-10-05 | Stop reason: SDUPTHER

## 2022-10-03 RX ORDER — METHOCARBAMOL 750 MG/1
750 TABLET, FILM COATED ORAL
Qty: 30 TABLET | Refills: 0 | Status: SHIPPED | OUTPATIENT
Start: 2022-10-03

## 2022-10-04 ENCOUNTER — TELEPHONE (OUTPATIENT)
Dept: FAMILY MEDICINE CLINIC | Facility: CLINIC | Age: 57
End: 2022-10-04

## 2022-10-04 DIAGNOSIS — J45.20 MILD INTERMITTENT REACTIVE AIRWAY DISEASE WITHOUT COMPLICATION: Primary | ICD-10-CM

## 2022-10-04 RX ORDER — ALBUTEROL SULFATE 90 UG/1
2 AEROSOL, METERED RESPIRATORY (INHALATION) EVERY 6 HOURS PRN
Qty: 6.7 G | Refills: 5 | Status: SHIPPED | OUTPATIENT
Start: 2022-10-04

## 2022-10-04 NOTE — TELEPHONE ENCOUNTER
I sent a new prescription  It does not have to be Ventolin  It can be any albuterol alternative that is covered on his formulary

## 2022-10-04 NOTE — TELEPHONE ENCOUNTER
Prior auth for Ventolin will require letter WHY  Pt would be contradictated to ALBUTEROL SULFATE HFA inhaler 8 5 mcg 90mcg   If a letter cannot show reasoning, [employer] suggesting changing to ALBUTEROL     Pls adivse

## 2022-10-05 DIAGNOSIS — N52.9 ERECTILE DYSFUNCTION, UNSPECIFIED ERECTILE DYSFUNCTION TYPE: ICD-10-CM

## 2022-10-05 RX ORDER — TADALAFIL 20 MG/1
10 TABLET ORAL DAILY PRN
Qty: 60 TABLET | Refills: 0 | Status: SHIPPED | OUTPATIENT
Start: 2022-10-05

## 2022-12-12 DIAGNOSIS — G89.29 CHRONIC BILATERAL LOW BACK PAIN WITHOUT SCIATICA: ICD-10-CM

## 2022-12-12 DIAGNOSIS — M54.50 CHRONIC BILATERAL LOW BACK PAIN WITHOUT SCIATICA: ICD-10-CM

## 2022-12-12 RX ORDER — METHOCARBAMOL 750 MG/1
750 TABLET, FILM COATED ORAL
Qty: 30 TABLET | Refills: 0 | Status: SHIPPED | OUTPATIENT
Start: 2022-12-12

## 2023-01-10 DIAGNOSIS — G89.29 CHRONIC BILATERAL LOW BACK PAIN WITHOUT SCIATICA: ICD-10-CM

## 2023-01-10 DIAGNOSIS — M54.50 CHRONIC BILATERAL LOW BACK PAIN WITHOUT SCIATICA: ICD-10-CM

## 2023-01-10 RX ORDER — METHOCARBAMOL 750 MG/1
750 TABLET, FILM COATED ORAL
Qty: 30 TABLET | Refills: 0 | Status: SHIPPED | OUTPATIENT
Start: 2023-01-10

## 2023-02-07 DIAGNOSIS — M54.50 CHRONIC BILATERAL LOW BACK PAIN WITHOUT SCIATICA: ICD-10-CM

## 2023-02-07 DIAGNOSIS — G89.29 CHRONIC BILATERAL LOW BACK PAIN WITHOUT SCIATICA: ICD-10-CM

## 2023-02-07 RX ORDER — METHOCARBAMOL 750 MG/1
750 TABLET, FILM COATED ORAL
Qty: 30 TABLET | Refills: 0 | Status: SHIPPED | OUTPATIENT
Start: 2023-02-07

## 2023-02-25 DIAGNOSIS — J45.20 MILD INTERMITTENT REACTIVE AIRWAY DISEASE WITHOUT COMPLICATION: ICD-10-CM

## 2023-02-25 DIAGNOSIS — N52.9 ERECTILE DYSFUNCTION, UNSPECIFIED ERECTILE DYSFUNCTION TYPE: ICD-10-CM

## 2023-02-27 RX ORDER — TADALAFIL 20 MG/1
10 TABLET ORAL DAILY PRN
Qty: 60 TABLET | Refills: 0 | Status: SHIPPED | OUTPATIENT
Start: 2023-02-27

## 2023-02-27 RX ORDER — ALBUTEROL SULFATE 90 UG/1
2 AEROSOL, METERED RESPIRATORY (INHALATION) EVERY 6 HOURS PRN
Qty: 6.7 G | Refills: 0 | Status: SHIPPED | OUTPATIENT
Start: 2023-02-27

## 2023-03-08 DIAGNOSIS — M54.50 CHRONIC BILATERAL LOW BACK PAIN WITHOUT SCIATICA: ICD-10-CM

## 2023-03-08 DIAGNOSIS — G89.29 CHRONIC BILATERAL LOW BACK PAIN WITHOUT SCIATICA: ICD-10-CM

## 2023-03-08 RX ORDER — METHOCARBAMOL 750 MG/1
750 TABLET, FILM COATED ORAL
Qty: 30 TABLET | Refills: 0 | Status: SHIPPED | OUTPATIENT
Start: 2023-03-08

## 2024-04-23 NOTE — PROGRESS NOTES
Chief Complaint   bilateral knee pain right worse than left    History Of Presenting Illness  Jaime Em 1965 presents with  Bilateral knee pain due to osteoarthritis  Patient presents for Synvisc-One injection      Current Medications  Current Outpatient Medications   Medication Sig Dispense Refill    atorvastatin (LIPITOR) 20 mg tablet TAKE 1 TABLET BY MOUTH EVERY DAY 30 tablet 0    cetirizine (ZyrTEC) 10 mg tablet Take 10 mg by mouth      diphenhydrAMINE-APAP  MG TABS Take 1 tablet by mouth      irbesartan (AVAPRO) 150 mg tablet TAKE 1 TABLET (150 MG TOTAL) BY MOUTH DAILY AT BEDTIME 30 tablet 3    levofloxacin (LEVAQUIN) 500 mg tablet Take 500 mg by mouth daily  0    methocarbamol (ROBAXIN) 750 mg tablet Take 750 mg by mouth daily at bedtime      sildenafil (VIAGRA) 100 mg tablet Take 1 tablet (100 mg total) by mouth daily as needed for erectile dysfunction 30 tablet 0    VENTOLIN  (90 Base) MCG/ACT inhaler Inhale 2 puffs every 4 (four) hours as needed  0    oxyCODONE-acetaminophen (PERCOCET) 5-325 mg per tablet Take 1 tablet by mouth every 6 (six) hours as needed for severe painMax Daily Amount: 4 tablets 8 tablet 0     No current facility-administered medications for this visit          Current Problems    Active Problems:   Patient Active Problem List    Diagnosis Date Noted    Screening for colon cancer 04/18/2019    Mixed hyperlipidemia 04/16/2019    Osteoarthritis of knee 03/22/2019    Knee pain 86/75/0931    Umbilical hernia without obstruction and without gangrene 30/96/1201    Umbilical hernia 53/98/2824    Shortness of breath     Lower back pain 11/13/2012         Review of Systems:    General: negative for - chills, fatigue, fever,  weight gain or weight loss  Psychological: negative for - anxiety, behavioral disorder, concentration difficulties  Ophthalmic: negative for - blurry vision, decreased vision, double vision,      Past Medical History:   Past Medical History:   Diagnosis Date    Bilateral leg weakness     Fractures     Hypertension     Pneumonia        Past Surgical History:   Past Surgical History:   Procedure Laterality Date    BACK SURGERY      thoracic    HERNIA REPAIR Bilateral     inguinal    KNEE ARTHROSCOPY Bilateral     KNEE SURGERY      MA EXC SKIN BENIG >4 CM TRUNK,ARM,LEG N/A 2/13/2019    Procedure: EXCISION LIPOMA TORSO;  Surgeon: Ashkan Mathew DO;  Location: [facility];  Service: General       Family History:  Family history reviewed and non-contributory  Family History   Problem Relation Age of Onset    No Known Problems Mother     Hypertension Father     No Known Problems Family        Social History:  Social History     Socioeconomic History    Marital status:      Spouse name: None    Number of children: None    Years of education: None    Highest education level: None   Occupational History    Occupation:    Social Needs    Financial resource strain: None    Food insecurity:     Worry: None     Inability: None    Transportation needs:     Medical: None     Non-medical: None   Tobacco Use    Smoking status: Former Smoker    Smokeless tobacco: Former User    Tobacco comment: quit 15 yrs ago   Substance and Sexual Activity    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    Drug use: Not Currently     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    Sexual activity: Yes     Comment: Denied: History of sexually active with persons at risk for HIV-related disease - As per [employer]    Lifestyle    Physical activity:     Days per week: None     Minutes per session: None    Stress: None   Relationships    Social connections:     Talks on phone: None     Gets together: None     Attends Shinto service: None     Active member of club or organization: None     Attends meetings of clubs or organizations: None     Relationship status: None    Intimate partner violence:     Fear of current or ex partner: None Emotionally abused: None     Physically abused: None     Forced sexual activity: None   Other Topics Concern    None   Social History Narrative    Denied: History of drug use - As per [employer]       Allergies:    Allergies   Allergen Reactions    No Active Allergies            Physical ExaminationBP 144/87   Pulse 67   Ht 6' 3" (1 905 m)   Wt 103 kg (227 lb)   BMI 28 37 kg/m²   Gen: Alert and oriented to person, place, time  HEENT: EOMI, eyes clear, moist mucus membranes, hearing intact      Orthopedic Exam   both knee examination 0-130 flexion joint line tenderness crepitus          Impression   bilateral knee osteoarthritis            Plan     discussed treatment with the patient   both knees injected with Synvisc-One no complication   follow-up in 2 months x-ray both knees AP lateral skyline on arrival  Large joint arthrocentesis: L knee  Date/Time: 9/18/2019 3:28 PM  Consent given by: patient  Site marked: site marked  Timeout: Immediately prior to procedure a time out was called to verify the correct patient, procedure, equipment, support staff and site/side marked as required   Supporting Documentation  Indications: pain and joint swelling   Procedure Details  Location: knee - L knee  Preparation: Patient was prepped and draped in the usual sterile fashion  Needle size: 22 G  Ultrasound guidance: no  Approach: anterolateral  Medications administered: 48 mg hylan 48 MG/6ML    Patient tolerance: patient tolerated the procedure well with no immediate complications  Dressing:  Sterile dressing applied    Large joint arthrocentesis: R knee  Date/Time: 9/18/2019 3:29 PM  Consent given by: patient  Site marked: site marked  Timeout: Immediately prior to procedure a time out was called to verify the correct patient, procedure, equipment, support staff and site/side marked as required   Supporting Documentation  Indications: pain and joint swelling   Procedure Details  Location: knee - R knee  Preparation: Patient was prepped and draped in the usual sterile fashion  Needle size: 22 G  Ultrasound guidance: no  Approach: anterolateral  Medications administered: 48 mg hylan 48 MG/6ML    Patient tolerance: patient tolerated the procedure well with no immediate complications  Dressing:  Sterile dressing applied        Noelle Morin MD        Portions of the record may have been created with voice recognition software  Occasional wrong word or "sound a like" substitutions may have occurred due to the inherent limitations of voice recognition software  Read the chart carefully and recognize, using context, where substitutions have occurred  Name band;

## (undated) DEVICE — SWABSTCK, BENZOIN TINCTURE, 1/PK, STRL: Brand: APLICARE

## (undated) DEVICE — INTENDED FOR TISSUE SEPARATION, AND OTHER PROCEDURES THAT REQUIRE A SHARP SURGICAL BLADE TO PUNCTURE OR CUT.: Brand: BARD-PARKER SAFETY BLADES SIZE 15, STERILE

## (undated) DEVICE — BETHLEHEM UNIVERSAL MINOR GEN: Brand: CARDINAL HEALTH

## (undated) DEVICE — TUBING SUCTION 5MM X 12 FT

## (undated) DEVICE — SUT VICRYL 3-0 SH 27 IN J416H

## (undated) DEVICE — SUT MONOCRYL 4-0 PS-2 27 IN Y426H

## (undated) DEVICE — CHLORAPREP HI-LITE 26ML ORANGE

## (undated) DEVICE — GLOVE SRG BIOGEL ECLIPSE 7

## (undated) DEVICE — SYRINGE 10ML LL

## (undated) DEVICE — NEEDLE 25G X 1 1/2

## (undated) DEVICE — MEDI-VAC YANK SUCT HNDL W/TPRD BULBOUS TIP: Brand: CARDINAL HEALTH

## (undated) DEVICE — SUT PROLENE 1 CT-1 30 IN 8425H

## (undated) DEVICE — SUT PROLENE 4-0 PS-2 18 IN 8682H

## (undated) DEVICE — 3M™ STERI-STRIP™ REINFORCED ADHESIVE SKIN CLOSURES, R1546, 1/4 IN X 4 IN (6 MM X 100 MM), 10 STRIPS/ENVELOPE: Brand: 3M™ STERI-STRIP™

## (undated) DEVICE — REM POLYHESIVE ADULT PATIENT RETURN ELECTRODE: Brand: VALLEYLAB

## (undated) DEVICE — GLOVE INDICATOR PI UNDERGLOVE SZ 7 BLUE

## (undated) DEVICE — 3M™ TEGADERM™ TRANSPARENT FILM DRESSING FRAME STYLE, 1626W, 4 IN X 4-3/4 IN (10 CM X 12 CM), 50/CT 4CT/CASE: Brand: 3M™ TEGADERM™

## (undated) DEVICE — GLOVE SRG BIOGEL 7

## (undated) DEVICE — SUT VICRYL 2-0 SH 27 IN UNDYED J417H

## (undated) DEVICE — PENROSE DRAIN, 18 X 3 8: Brand: CARDINAL HEALTH

## (undated) DEVICE — PLUMEPEN PRO 10FT

## (undated) DEVICE — GAUZE SPONGES,16 PLY: Brand: CURITY